# Patient Record
Sex: MALE | Race: WHITE | NOT HISPANIC OR LATINO | Employment: OTHER | URBAN - METROPOLITAN AREA
[De-identification: names, ages, dates, MRNs, and addresses within clinical notes are randomized per-mention and may not be internally consistent; named-entity substitution may affect disease eponyms.]

---

## 2022-09-02 ENCOUNTER — APPOINTMENT (OUTPATIENT)
Dept: MRI IMAGING | Facility: HOSPITAL | Age: 82
End: 2022-09-02

## 2022-09-02 ENCOUNTER — APPOINTMENT (OUTPATIENT)
Dept: GENERAL RADIOLOGY | Facility: HOSPITAL | Age: 82
End: 2022-09-02

## 2022-09-02 ENCOUNTER — APPOINTMENT (OUTPATIENT)
Dept: CT IMAGING | Facility: HOSPITAL | Age: 82
End: 2022-09-02

## 2022-09-02 ENCOUNTER — HOSPITAL ENCOUNTER (OUTPATIENT)
Facility: HOSPITAL | Age: 82
Setting detail: OBSERVATION
Discharge: HOME | End: 2022-09-03
Attending: EMERGENCY MEDICINE | Admitting: PEDIATRICS

## 2022-09-02 DIAGNOSIS — R47.1 DYSARTHRIA: Primary | ICD-10-CM

## 2022-09-02 PROBLEM — I48.20 CHRONIC A-FIB: Status: ACTIVE | Noted: 2022-09-02

## 2022-09-02 PROBLEM — E11.9 DM (DIABETES MELLITUS): Status: ACTIVE | Noted: 2022-09-02

## 2022-09-02 PROBLEM — I10 HTN (HYPERTENSION): Status: ACTIVE | Noted: 2022-09-02

## 2022-09-02 PROBLEM — E78.5 HLD (HYPERLIPIDEMIA): Status: ACTIVE | Noted: 2022-09-02

## 2022-09-02 LAB
ALT SERPL W P-5'-P-CCNC: 14 U/L (ref 1–41)
APTT PPP: 36 SECONDS (ref 22–39)
AST SERPL-CCNC: 17 U/L (ref 1–40)
BASE EXCESS BLDA CALC-SCNC: 6 MMOL/L (ref -5–5)
BASOPHILS # BLD AUTO: 0.04 10*3/MM3 (ref 0–0.2)
BASOPHILS NFR BLD AUTO: 0.7 % (ref 0–1.5)
CA-I BLDA-SCNC: 1.31 MMOL/L (ref 1.2–1.32)
CO2 BLDA-SCNC: 34 MMOL/L (ref 24–29)
CREAT BLDA-MCNC: 1.3 MG/DL (ref 0.6–1.3)
DEPRECATED RDW RBC AUTO: 42.9 FL (ref 37–54)
EOSINOPHIL # BLD AUTO: 0.1 10*3/MM3 (ref 0–0.4)
EOSINOPHIL NFR BLD AUTO: 1.7 % (ref 0.3–6.2)
ERYTHROCYTE [DISTWIDTH] IN BLOOD BY AUTOMATED COUNT: 12.5 % (ref 12.3–15.4)
GLUCOSE BLDC GLUCOMTR-MCNC: 124 MG/DL (ref 70–130)
GLUCOSE BLDC GLUCOMTR-MCNC: 165 MG/DL (ref 70–130)
HCO3 BLDA-SCNC: 31.8 MMOL/L (ref 22–26)
HCT VFR BLD AUTO: 42.3 % (ref 37.5–51)
HCT VFR BLDA CALC: 42 % (ref 38–51)
HGB BLD-MCNC: 14.6 G/DL (ref 13–17.7)
HGB BLDA-MCNC: 14.3 G/DL (ref 12–17)
HOLD SPECIMEN: NORMAL
IMM GRANULOCYTES # BLD AUTO: 0.01 10*3/MM3 (ref 0–0.05)
IMM GRANULOCYTES NFR BLD AUTO: 0.2 % (ref 0–0.5)
INR PPP: 1.2 (ref 0.8–1.2)
LYMPHOCYTES # BLD AUTO: 2.17 10*3/MM3 (ref 0.7–3.1)
LYMPHOCYTES NFR BLD AUTO: 36.7 % (ref 19.6–45.3)
MCH RBC QN AUTO: 32.1 PG (ref 26.6–33)
MCHC RBC AUTO-ENTMCNC: 34.5 G/DL (ref 31.5–35.7)
MCV RBC AUTO: 93 FL (ref 79–97)
MONOCYTES # BLD AUTO: 0.44 10*3/MM3 (ref 0.1–0.9)
MONOCYTES NFR BLD AUTO: 7.4 % (ref 5–12)
NEUTROPHILS NFR BLD AUTO: 3.15 10*3/MM3 (ref 1.7–7)
NEUTROPHILS NFR BLD AUTO: 53.3 % (ref 42.7–76)
NRBC BLD AUTO-RTO: 0 /100 WBC (ref 0–0.2)
PCO2 BLDA: 61.2 MM HG (ref 35–45)
PH BLDA: 7.32 PH UNITS (ref 7.35–7.6)
PLATELET # BLD AUTO: 147 10*3/MM3 (ref 140–450)
PMV BLD AUTO: 9.3 FL (ref 6–12)
PO2 BLDA: 13 MMHG (ref 80–105)
POTASSIUM BLDA-SCNC: 4.4 MMOL/L (ref 3.5–4.9)
PROTHROMBIN TIME: 14.2 SECONDS (ref 12.8–15.2)
QT INTERVAL: 428 MS
QTC INTERVAL: 423 MS
RBC # BLD AUTO: 4.55 10*6/MM3 (ref 4.14–5.8)
SAO2 % BLDA: 13 % (ref 95–98)
SODIUM BLD-SCNC: 141 MMOL/L (ref 138–146)
TROPONIN T SERPL-MCNC: <0.01 NG/ML (ref 0–0.03)
WBC NRBC COR # BLD: 5.91 10*3/MM3 (ref 3.4–10.8)
WHOLE BLOOD HOLD COAG: NORMAL
WHOLE BLOOD HOLD SPECIMEN: NORMAL

## 2022-09-02 PROCEDURE — 93005 ELECTROCARDIOGRAM TRACING: CPT | Performed by: EMERGENCY MEDICINE

## 2022-09-02 PROCEDURE — 84295 ASSAY OF SERUM SODIUM: CPT

## 2022-09-02 PROCEDURE — G0378 HOSPITAL OBSERVATION PER HR: HCPCS

## 2022-09-02 PROCEDURE — 84132 ASSAY OF SERUM POTASSIUM: CPT

## 2022-09-02 PROCEDURE — 85014 HEMATOCRIT: CPT

## 2022-09-02 PROCEDURE — 70496 CT ANGIOGRAPHY HEAD: CPT

## 2022-09-02 PROCEDURE — 84484 ASSAY OF TROPONIN QUANT: CPT | Performed by: EMERGENCY MEDICINE

## 2022-09-02 PROCEDURE — 99285 EMERGENCY DEPT VISIT HI MDM: CPT

## 2022-09-02 PROCEDURE — 82565 ASSAY OF CREATININE: CPT

## 2022-09-02 PROCEDURE — 84450 TRANSFERASE (AST) (SGOT): CPT | Performed by: EMERGENCY MEDICINE

## 2022-09-02 PROCEDURE — 63710000001 INSULIN LISPRO (HUMAN) PER 5 UNITS: Performed by: NURSE PRACTITIONER

## 2022-09-02 PROCEDURE — 70498 CT ANGIOGRAPHY NECK: CPT

## 2022-09-02 PROCEDURE — 99220 PR INITIAL OBSERVATION CARE/DAY 70 MINUTES: CPT | Performed by: INTERNAL MEDICINE

## 2022-09-02 PROCEDURE — 70450 CT HEAD/BRAIN W/O DYE: CPT

## 2022-09-02 PROCEDURE — 0042T HC CT CEREBRAL PERFUSION W/WO CONTRAST: CPT

## 2022-09-02 PROCEDURE — 85610 PROTHROMBIN TIME: CPT

## 2022-09-02 PROCEDURE — 99204 OFFICE O/P NEW MOD 45 MIN: CPT | Performed by: CLINICAL NURSE SPECIALIST

## 2022-09-02 PROCEDURE — 84460 ALANINE AMINO (ALT) (SGPT): CPT | Performed by: EMERGENCY MEDICINE

## 2022-09-02 PROCEDURE — 36415 COLL VENOUS BLD VENIPUNCTURE: CPT

## 2022-09-02 PROCEDURE — 82947 ASSAY GLUCOSE BLOOD QUANT: CPT

## 2022-09-02 PROCEDURE — 82803 BLOOD GASES ANY COMBINATION: CPT

## 2022-09-02 PROCEDURE — 85025 COMPLETE CBC W/AUTO DIFF WBC: CPT | Performed by: EMERGENCY MEDICINE

## 2022-09-02 PROCEDURE — 85730 THROMBOPLASTIN TIME PARTIAL: CPT | Performed by: EMERGENCY MEDICINE

## 2022-09-02 PROCEDURE — 71045 X-RAY EXAM CHEST 1 VIEW: CPT

## 2022-09-02 PROCEDURE — 82330 ASSAY OF CALCIUM: CPT

## 2022-09-02 PROCEDURE — 0 IOPAMIDOL PER 1 ML: Performed by: EMERGENCY MEDICINE

## 2022-09-02 RX ORDER — UBIDECARENONE 100 MG
100 CAPSULE ORAL DAILY
COMMUNITY

## 2022-09-02 RX ORDER — INSULIN LISPRO 100 [IU]/ML
0-7 INJECTION, SOLUTION INTRAVENOUS; SUBCUTANEOUS
Status: DISCONTINUED | OUTPATIENT
Start: 2022-09-02 | End: 2022-09-03 | Stop reason: HOSPADM

## 2022-09-02 RX ORDER — SODIUM CHLORIDE 0.9 % (FLUSH) 0.9 %
10 SYRINGE (ML) INJECTION AS NEEDED
Status: DISCONTINUED | OUTPATIENT
Start: 2022-09-02 | End: 2022-09-03 | Stop reason: HOSPADM

## 2022-09-02 RX ORDER — BISACODYL 5 MG/1
5 TABLET, DELAYED RELEASE ORAL DAILY PRN
Status: DISCONTINUED | OUTPATIENT
Start: 2022-09-02 | End: 2022-09-03 | Stop reason: HOSPADM

## 2022-09-02 RX ORDER — CHOLECALCIFEROL (VITAMIN D3) 125 MCG
5 CAPSULE ORAL
COMMUNITY

## 2022-09-02 RX ORDER — AMOXICILLIN 250 MG
2 CAPSULE ORAL 2 TIMES DAILY PRN
Status: DISCONTINUED | OUTPATIENT
Start: 2022-09-02 | End: 2022-09-03 | Stop reason: HOSPADM

## 2022-09-02 RX ORDER — CHOLECALCIFEROL (VITAMIN D3) 125 MCG
5 CAPSULE ORAL NIGHTLY
Status: DISCONTINUED | OUTPATIENT
Start: 2022-09-02 | End: 2022-09-03 | Stop reason: HOSPADM

## 2022-09-02 RX ORDER — METOPROLOL TARTRATE 100 MG/1
100 TABLET ORAL 2 TIMES DAILY
COMMUNITY

## 2022-09-02 RX ORDER — GYMNEMA LEAF 100 %
600 POWDER (GRAM) MISCELLANEOUS 2 TIMES DAILY
COMMUNITY

## 2022-09-02 RX ORDER — ONDANSETRON 4 MG/1
4 TABLET, FILM COATED ORAL EVERY 6 HOURS PRN
Status: DISCONTINUED | OUTPATIENT
Start: 2022-09-02 | End: 2022-09-03 | Stop reason: HOSPADM

## 2022-09-02 RX ORDER — MECLIZINE HYDROCHLORIDE 25 MG/1
25 TABLET ORAL 3 TIMES DAILY PRN
COMMUNITY

## 2022-09-02 RX ORDER — TAMSULOSIN HYDROCHLORIDE 0.4 MG/1
0.4 CAPSULE ORAL DAILY
Status: DISCONTINUED | OUTPATIENT
Start: 2022-09-03 | End: 2022-09-03 | Stop reason: HOSPADM

## 2022-09-02 RX ORDER — BISACODYL 10 MG
10 SUPPOSITORY, RECTAL RECTAL DAILY PRN
Status: DISCONTINUED | OUTPATIENT
Start: 2022-09-02 | End: 2022-09-03 | Stop reason: HOSPADM

## 2022-09-02 RX ORDER — MECLIZINE HCL 12.5 MG/1
25 TABLET ORAL 3 TIMES DAILY PRN
Status: DISCONTINUED | OUTPATIENT
Start: 2022-09-02 | End: 2022-09-03 | Stop reason: HOSPADM

## 2022-09-02 RX ORDER — ACETAMINOPHEN 325 MG/1
650 TABLET ORAL EVERY 4 HOURS PRN
Status: DISCONTINUED | OUTPATIENT
Start: 2022-09-02 | End: 2022-09-03 | Stop reason: HOSPADM

## 2022-09-02 RX ORDER — SODIUM CHLORIDE 0.9 % (FLUSH) 0.9 %
10 SYRINGE (ML) INJECTION EVERY 12 HOURS SCHEDULED
Status: DISCONTINUED | OUTPATIENT
Start: 2022-09-02 | End: 2022-09-03 | Stop reason: HOSPADM

## 2022-09-02 RX ORDER — ASPIRIN 81 MG/1
81 TABLET, CHEWABLE ORAL DAILY
Status: DISCONTINUED | OUTPATIENT
Start: 2022-09-02 | End: 2022-09-03 | Stop reason: HOSPADM

## 2022-09-02 RX ORDER — POLYETHYLENE GLYCOL 3350 17 G/17G
17 POWDER, FOR SOLUTION ORAL DAILY PRN
Status: DISCONTINUED | OUTPATIENT
Start: 2022-09-02 | End: 2022-09-03 | Stop reason: HOSPADM

## 2022-09-02 RX ORDER — TAMSULOSIN HYDROCHLORIDE 0.4 MG/1
1 CAPSULE ORAL DAILY
COMMUNITY

## 2022-09-02 RX ORDER — ACETAMINOPHEN 160 MG/5ML
650 SOLUTION ORAL EVERY 4 HOURS PRN
Status: DISCONTINUED | OUTPATIENT
Start: 2022-09-02 | End: 2022-09-03 | Stop reason: HOSPADM

## 2022-09-02 RX ORDER — ONDANSETRON 2 MG/ML
4 INJECTION INTRAMUSCULAR; INTRAVENOUS EVERY 6 HOURS PRN
Status: DISCONTINUED | OUTPATIENT
Start: 2022-09-02 | End: 2022-09-03 | Stop reason: HOSPADM

## 2022-09-02 RX ORDER — ASPIRIN 300 MG/1
300 SUPPOSITORY RECTAL DAILY
Status: DISCONTINUED | OUTPATIENT
Start: 2022-09-02 | End: 2022-09-03 | Stop reason: HOSPADM

## 2022-09-02 RX ORDER — NICOTINE POLACRILEX 4 MG
15 LOZENGE BUCCAL
Status: DISCONTINUED | OUTPATIENT
Start: 2022-09-02 | End: 2022-09-03 | Stop reason: HOSPADM

## 2022-09-02 RX ORDER — DEXTROSE MONOHYDRATE 25 G/50ML
25 INJECTION, SOLUTION INTRAVENOUS
Status: DISCONTINUED | OUTPATIENT
Start: 2022-09-02 | End: 2022-09-03 | Stop reason: HOSPADM

## 2022-09-02 RX ORDER — ACETAMINOPHEN 650 MG/1
650 SUPPOSITORY RECTAL EVERY 4 HOURS PRN
Status: DISCONTINUED | OUTPATIENT
Start: 2022-09-02 | End: 2022-09-03 | Stop reason: HOSPADM

## 2022-09-02 RX ORDER — ATORVASTATIN CALCIUM 40 MG/1
80 TABLET, FILM COATED ORAL NIGHTLY
Status: DISCONTINUED | OUTPATIENT
Start: 2022-09-02 | End: 2022-09-03 | Stop reason: HOSPADM

## 2022-09-02 RX ADMIN — Medication 10 ML: at 22:22

## 2022-09-02 RX ADMIN — INSULIN LISPRO 2 UNITS: 100 INJECTION, SOLUTION INTRAVENOUS; SUBCUTANEOUS at 18:27

## 2022-09-02 RX ADMIN — APIXABAN 5 MG: 5 TABLET, FILM COATED ORAL at 22:22

## 2022-09-02 RX ADMIN — IOPAMIDOL 115 ML: 755 INJECTION, SOLUTION INTRAVENOUS at 14:08

## 2022-09-02 RX ADMIN — Medication 5 MG: at 22:22

## 2022-09-02 RX ADMIN — ATORVASTATIN CALCIUM 80 MG: 40 TABLET, FILM COATED ORAL at 22:22

## 2022-09-02 RX ADMIN — ASPIRIN 81 MG 81 MG: 81 TABLET ORAL at 14:47

## 2022-09-02 NOTE — NURSING NOTE
Received Pt from ED. A&O X4. [facility] 2. VSS on RA. Bedside dysphagia completed. Pt has no c/o of pain at this time. Son at bedside.

## 2022-09-02 NOTE — H&P
[facility] Medicine Services  HISTORY AND PHYSICAL    Patient Name: Jefferson Arriaga  : 1940  MRN: [id number]  Primary Care Physician: Brian Ding MD  Date of admission: 2022    Subjective   Subjective     Chief Complaint:  Slurred speech     HPI:  Jefferson Arriaga is a 81 y.o. male with PMH of Afib on anticoagulation, HLD, HTN, BPH, choley, CABG, DM. Patient states he was talking on the phone with his daughter at 9 am this morning and she told him he was slurring his words. He states he felt like he was having trouble focusing and finding what words. He states he has noticed this before with his thoughts. He has been having blurry vision 3-4 times a week for a few months. He denies any cp, soa, N/V/D, decreased sensation, or weakness in any ext. He has been on eliquis for years for afib and he states he takes it as prescribed. On exam he has no sensory deficit, visual changes, no unilateral weakness or problems with speech.     In Ed: creat 1.30, K 4.4, trop <0.010, WBC 5.91,         Review of Systems   Constitutional: Negative for activity change, appetite change, fatigue and fever.   Respiratory: Negative for shortness of breath.    Cardiovascular: Negative for chest pain.   Gastrointestinal: Negative for diarrhea and nausea.   Musculoskeletal: Negative for gait problem.   Neurological: Positive for facial asymmetry and speech difficulty. Negative for weakness and numbness.        All other systems reviewed and are negative.     Personal History     Past Medical History:   Diagnosis Date   • Atrial fibrillation (HCC)    • Diabetes mellitus (HCC)    • Hyperlipidemia    • Hypertension    • Prostate enlargement          Past Surgical History:   Procedure Laterality Date   • ABDOMINAL AORTIC ANEURYSM REPAIR, AORTIC BIFEMORAL ILIAC RENAL BYPASS     • CHOLECYSTECTOMY     • CORONARY ARTERY BYPASS GRAFT     • PROSTATE SURGERY     • TONSILLECTOMY     • VASCULAR SURGERY         Family  History: Mother lived into her 80s, father  in his 70s otherwise pertinent FHx was reviewed and unremarkable.     Social History:  reports that he has quit smoking. He has never used smokeless tobacco. He reports previous alcohol use. He reports that he does not use drugs.  Tired , , lives in [facility], grew up in [place]    Medications:  Alpha-Lipoic Acid, Fenugreek, Gymnema Sylvestris Leaf, apixaban, coenzyme Q10, meclizine, melatonin, metFORMIN, metoprolol tartrate, tamsulosin, and vitamin D3    No Known Allergies    Objective   Objective     Vital Signs:   Temp:  [98.9 °F (37.2 °C)] 98.9 °F (37.2 °C)  Heart Rate:  [54-66] 57  Resp:  [16] 16  BP: (125-160)/() 143/78    Physical Exam   Constitutional: Awake, alert  Eyes: PERRLA, sclerae anicteric, no conjunctival injection  HENT: NCAT, mucous membranes moist  Neck: Supple, no thyromegaly, no lymphadenopathy, trachea midline  Respiratory: Clear to auscultation bilaterally, nonlabored respirations room air 99%   Cardiovascular: RRR, Sinus arrhythmia , no murmurs, rubs, or gallops, palpable pedal pulses bilaterally  Gastrointestinal: Positive bowel sounds, soft, nontender, nondistended  Musculoskeletal: No bilateral ankle edema, no clubbing or cyanosis to extremities  Psychiatric: Appropriate affect, cooperative  Neurologic: Oriented x 3, strength symmetric in all extremities, Cranial Nerves grossly intact to confrontation, speech clear, chronic malu UE tremors, no decreased sensation   Skin: No rashes      Results Reviewed:  I have personally reviewed most recent indicated data and agree with findings including:  [x]  Laboratory  [x]  Radiology  []  EKG/Telemetry  []  Pathology  []  Cardiac/Vascular Studies  []  Old records  []  Other:  Most pertinent findings include:      LAB RESULTS:      Lab 22  1402 22  1357 22  1356   WBC 5.91  --   --    HEMOGLOBIN 14.6  --   --    HEMOGLOBIN, POC  --  14.3  --     HEMATOCRIT 42.3  --   --    HEMATOCRIT POC  --  42  --    PLATELETS 147  --   --    NEUTROS ABS 3.15  --   --    IMMATURE GRANS (ABS) 0.01  --   --    LYMPHS ABS 2.17  --   --    MONOS ABS 0.44  --   --    EOS ABS 0.10  --   --    MCV 93.0  --   --    PROTIME  --   --  14.2   APTT 36.0  --   --          Lab 09/02/22  1408   CREATININE 1.30         Lab 09/02/22  1402   ALT (SGPT) 14   AST (SGOT) 17         Lab 09/02/22  1402 09/02/22  1356   TROPONIN T <0.010  --    PROTIME  --  14.2   INR  --  1.2                 Lab 09/02/22  1357   PH, ARTERIAL 7.32*     Brief Urine Lab Results     None        Microbiology Results (last 10 days)     ** No results found for the last 240 hours. **          CT Angiogram Neck    Result Date: 9/2/2022  DATE OF EXAM: 9/2/2022 or utilized for the vascular catheter with 2:05 PM  PROCEDURE: CT CEREBRAL PERFUSION W WO CONTRAST-, CT HEAD WO CONTRAST STROKE PROTOCOL-, CT ANGIOGRAM NECK-, CT ANGIOGRAM HEAD W AI ANALYSIS OF LVO-  INDICATIONS: Neuro deficit, acute, stroke suspected  COMPARISON: No comparisons available.  TECHNIQUE: Routine transaxial cuts were obtained through the head without administration of contrast. Routine transaxial cuts were then obtained through the head following the intravenous administration of 115 mL of Isovue 300. Core blood volume, core blood flow, mean transit time, and Tmax images were obtained utilizing the Rapid software protocol. A limited CT angiogram of the head was also performed to measure the blood vessel density.  Axial noncontrast CT of the head with multiplanar reconstruction.  Axial IV arterial phase contrast-enhanced CT angiogram of the head and neck. Three-dimensional reconstructions were postprocessed.  AI analysis of LVO was also performed.  The radiation dose reduction device was turned on for each scan per the ALARA (As Low as Reasonably Achievable) protocol.  FINDINGS:  CT head: Gray-white differentiation is maintained and there is no  evidence of intracranial hemorrhage, mass or mass effect. Age-related changes of the brain are present including volume loss and typical periventricular sequela of chronic small vessel ischemia. There is otherwise no evidence of intracranial hemorrhage, mass or mass effect. The ventricles are normal in size and configuration accounting for surrounding volume loss. The orbits are normal and the paranasal sinuses are grossly clear.  CT PERFUSION: Color maps demonstrate no focal relative deficit in cerebral blood volume to suggest core infarct. There is no evidence of territorial ischemic tissue at risk.  CT ANGIOGRAM: The lung apices are grossly clear. Evaluation of the neck soft tissues demonstrates no pathologic adenopathy or unexpected soft tissue neck mass. Evaluation of the osseous structures demonstrates no evidence of acute fracture or aggressive osseous lesion, with some mild multilevel spondylosis change present. On the right, there is no significant atherosclerotic narrowing of the ICA origin, 0% by the set criteria. Similar 0% narrowing is present on the left. The right vertebral artery is dominant, with a diminutive but otherwise patent left vertebral artery. Intracranially, the carotid siphons demonstrate mild calcific atherosclerotic change, without significant resultant luminal narrowing. The anterior cerebral arteries are mildly tortuous, otherwise normal in course and caliber. The right middle cerebral artery demonstrates no evidence of flow-limiting stenosis, large vessel occlusion or aneurysmal dilatation. The left middle cerebral artery is similarly normal in course and caliber. The vertebral arteries demonstrate some calcific atherosclerotic change on the right, without associated high-grade stenosis. The diminutive left vertebral artery terminates in PICA. The basilar artery is patent. The posterior cerebral arteries are normal in course and caliber bilaterally.      Impression: Age-related  changes of the brain as above, otherwise without evidence of acute intracranial abnormality.  CT perfusion demonstrates no evidence of core infarct or significant territorial ischemic tissue at risk.  CT angiogram demonstrates no evidence of flow-limiting stenosis, large vessel occlusion or aneurysmal dilatation. Some multifocal tortuosity is present, suggesting chronic underlying hypertension.  Noncontrast CT head performed at 2:03 PM 9/2/2022. Results discussed with the stroke team by João Brush in person at 2:07 PM same day   This report was finalized on 9/2/2022 2:37 PM by João Brush.      XR Chest 1 View    Result Date: 9/2/2022  XR CHEST 1 VW-  Date of Exam: 9/2/2022 2:03 PM  Indication: Acute Stroke Protocol (onset < 12 hrs).  Comparison Exams: None available.  Technique: Single AP chest radiograph  FINDINGS: The lungs are clear. The heart and mediastinal contours appear normal. The pulmonary vasculature appears normal. The osseous structures appear intact. Median sternotomy wires appear intact.      Impression: No acute cardiopulmonary process identified.  This report was finalized on 9/2/2022 2:53 PM by Jorgito Velasquez MD.      CT Head Without Contrast Stroke Protocol    Result Date: 9/2/2022  DATE OF EXAM: 9/2/2022 or utilized for the vascular catheter with 2:05 PM  PROCEDURE: CT CEREBRAL PERFUSION W WO CONTRAST-, CT HEAD WO CONTRAST STROKE PROTOCOL-, CT ANGIOGRAM NECK-, CT ANGIOGRAM HEAD W AI ANALYSIS OF LVO-  INDICATIONS: Neuro deficit, acute, stroke suspected  COMPARISON: No comparisons available.  TECHNIQUE: Routine transaxial cuts were obtained through the head without administration of contrast. Routine transaxial cuts were then obtained through the head following the intravenous administration of 115 mL of Isovue 300. Core blood volume, core blood flow, mean transit time, and Tmax images were obtained utilizing the Rapid software protocol. A limited CT angiogram of the head was also  performed to measure the blood vessel density.  Axial noncontrast CT of the head with multiplanar reconstruction.  Axial IV arterial phase contrast-enhanced CT angiogram of the head and neck. Three-dimensional reconstructions were postprocessed.  AI analysis of LVO was also performed.  The radiation dose reduction device was turned on for each scan per the ALARA (As Low as Reasonably Achievable) protocol.  FINDINGS:  CT head: Gray-white differentiation is maintained and there is no evidence of intracranial hemorrhage, mass or mass effect. Age-related changes of the brain are present including volume loss and typical periventricular sequela of chronic small vessel ischemia. There is otherwise no evidence of intracranial hemorrhage, mass or mass effect. The ventricles are normal in size and configuration accounting for surrounding volume loss. The orbits are normal and the paranasal sinuses are grossly clear.  CT PERFUSION: Color maps demonstrate no focal relative deficit in cerebral blood volume to suggest core infarct. There is no evidence of territorial ischemic tissue at risk.  CT ANGIOGRAM: The lung apices are grossly clear. Evaluation of the neck soft tissues demonstrates no pathologic adenopathy or unexpected soft tissue neck mass. Evaluation of the osseous structures demonstrates no evidence of acute fracture or aggressive osseous lesion, with some mild multilevel spondylosis change present. On the right, there is no significant atherosclerotic narrowing of the ICA origin, 0% by the set criteria. Similar 0% narrowing is present on the left. The right vertebral artery is dominant, with a diminutive but otherwise patent left vertebral artery. Intracranially, the carotid siphons demonstrate mild calcific atherosclerotic change, without significant resultant luminal narrowing. The anterior cerebral arteries are mildly tortuous, otherwise normal in course and caliber. The right middle cerebral artery demonstrates  no evidence of flow-limiting stenosis, large vessel occlusion or aneurysmal dilatation. The left middle cerebral artery is similarly normal in course and caliber. The vertebral arteries demonstrate some calcific atherosclerotic change on the right, without associated high-grade stenosis. The diminutive left vertebral artery terminates in PICA. The basilar artery is patent. The posterior cerebral arteries are normal in course and caliber bilaterally.      Impression: Age-related changes of the brain as above, otherwise without evidence of acute intracranial abnormality.  CT perfusion demonstrates no evidence of core infarct or significant territorial ischemic tissue at risk.  CT angiogram demonstrates no evidence of flow-limiting stenosis, large vessel occlusion or aneurysmal dilatation. Some multifocal tortuosity is present, suggesting chronic underlying hypertension.  Noncontrast CT head performed at 2:03 PM 9/2/2022. Results discussed with the stroke team by João Brush in person at 2:07 PM same day   This report was finalized on 9/2/2022 2:37 PM by João Brush.      CT Angiogram Head w AI Analysis of LVO    Result Date: 9/2/2022  DATE OF EXAM: 9/2/2022 or utilized for the vascular catheter with 2:05 PM  PROCEDURE: CT CEREBRAL PERFUSION W WO CONTRAST-, CT HEAD WO CONTRAST STROKE PROTOCOL-, CT ANGIOGRAM NECK-, CT ANGIOGRAM HEAD W AI ANALYSIS OF LVO-  INDICATIONS: Neuro deficit, acute, stroke suspected  COMPARISON: No comparisons available.  TECHNIQUE: Routine transaxial cuts were obtained through the head without administration of contrast. Routine transaxial cuts were then obtained through the head following the intravenous administration of 115 mL of Isovue 300. Core blood volume, core blood flow, mean transit time, and Tmax images were obtained utilizing the Rapid software protocol. A limited CT angiogram of the head was also performed to measure the blood vessel density.  Axial noncontrast CT of the  head with multiplanar reconstruction.  Axial IV arterial phase contrast-enhanced CT angiogram of the head and neck. Three-dimensional reconstructions were postprocessed.  AI analysis of LVO was also performed.  The radiation dose reduction device was turned on for each scan per the ALARA (As Low as Reasonably Achievable) protocol.  FINDINGS:  CT head: Gray-white differentiation is maintained and there is no evidence of intracranial hemorrhage, mass or mass effect. Age-related changes of the brain are present including volume loss and typical periventricular sequela of chronic small vessel ischemia. There is otherwise no evidence of intracranial hemorrhage, mass or mass effect. The ventricles are normal in size and configuration accounting for surrounding volume loss. The orbits are normal and the paranasal sinuses are grossly clear.  CT PERFUSION: Color maps demonstrate no focal relative deficit in cerebral blood volume to suggest core infarct. There is no evidence of territorial ischemic tissue at risk.  CT ANGIOGRAM: The lung apices are grossly clear. Evaluation of the neck soft tissues demonstrates no pathologic adenopathy or unexpected soft tissue neck mass. Evaluation of the osseous structures demonstrates no evidence of acute fracture or aggressive osseous lesion, with some mild multilevel spondylosis change present. On the right, there is no significant atherosclerotic narrowing of the ICA origin, 0% by the set criteria. Similar 0% narrowing is present on the left. The right vertebral artery is dominant, with a diminutive but otherwise patent left vertebral artery. Intracranially, the carotid siphons demonstrate mild calcific atherosclerotic change, without significant resultant luminal narrowing. The anterior cerebral arteries are mildly tortuous, otherwise normal in course and caliber. The right middle cerebral artery demonstrates no evidence of flow-limiting stenosis, large vessel occlusion or aneurysmal  dilatation. The left middle cerebral artery is similarly normal in course and caliber. The vertebral arteries demonstrate some calcific atherosclerotic change on the right, without associated high-grade stenosis. The diminutive left vertebral artery terminates in PICA. The basilar artery is patent. The posterior cerebral arteries are normal in course and caliber bilaterally.      Impression: Age-related changes of the brain as above, otherwise without evidence of acute intracranial abnormality.  CT perfusion demonstrates no evidence of core infarct or significant territorial ischemic tissue at risk.  CT angiogram demonstrates no evidence of flow-limiting stenosis, large vessel occlusion or aneurysmal dilatation. Some multifocal tortuosity is present, suggesting chronic underlying hypertension.  Noncontrast CT head performed at 2:03 PM 9/2/2022. Results discussed with the stroke team by João Brush in person at 2:07 PM same day   This report was finalized on 9/2/2022 2:37 PM by João Brush.      CT CEREBRAL PERFUSION WITH & WITHOUT CONTRAST    Result Date: 9/2/2022  DATE OF EXAM: 9/2/2022 or utilized for the vascular catheter with 2:05 PM  PROCEDURE: CT CEREBRAL PERFUSION W WO CONTRAST-, CT HEAD WO CONTRAST STROKE PROTOCOL-, CT ANGIOGRAM NECK-, CT ANGIOGRAM HEAD W AI ANALYSIS OF LVO-  INDICATIONS: Neuro deficit, acute, stroke suspected  COMPARISON: No comparisons available.  TECHNIQUE: Routine transaxial cuts were obtained through the head without administration of contrast. Routine transaxial cuts were then obtained through the head following the intravenous administration of 115 mL of Isovue 300. Core blood volume, core blood flow, mean transit time, and Tmax images were obtained utilizing the Rapid software protocol. A limited CT angiogram of the head was also performed to measure the blood vessel density.  Axial noncontrast CT of the head with multiplanar reconstruction.  Axial IV arterial phase  contrast-enhanced CT angiogram of the head and neck. Three-dimensional reconstructions were postprocessed.  AI analysis of LVO was also performed.  The radiation dose reduction device was turned on for each scan per the ALARA (As Low as Reasonably Achievable) protocol.  FINDINGS:  CT head: Gray-white differentiation is maintained and there is no evidence of intracranial hemorrhage, mass or mass effect. Age-related changes of the brain are present including volume loss and typical periventricular sequela of chronic small vessel ischemia. There is otherwise no evidence of intracranial hemorrhage, mass or mass effect. The ventricles are normal in size and configuration accounting for surrounding volume loss. The orbits are normal and the paranasal sinuses are grossly clear.  CT PERFUSION: Color maps demonstrate no focal relative deficit in cerebral blood volume to suggest core infarct. There is no evidence of territorial ischemic tissue at risk.  CT ANGIOGRAM: The lung apices are grossly clear. Evaluation of the neck soft tissues demonstrates no pathologic adenopathy or unexpected soft tissue neck mass. Evaluation of the osseous structures demonstrates no evidence of acute fracture or aggressive osseous lesion, with some mild multilevel spondylosis change present. On the right, there is no significant atherosclerotic narrowing of the ICA origin, 0% by the set criteria. Similar 0% narrowing is present on the left. The right vertebral artery is dominant, with a diminutive but otherwise patent left vertebral artery. Intracranially, the carotid siphons demonstrate mild calcific atherosclerotic change, without significant resultant luminal narrowing. The anterior cerebral arteries are mildly tortuous, otherwise normal in course and caliber. The right middle cerebral artery demonstrates no evidence of flow-limiting stenosis, large vessel occlusion or aneurysmal dilatation. The left middle cerebral artery is similarly normal  in course and caliber. The vertebral arteries demonstrate some calcific atherosclerotic change on the right, without associated high-grade stenosis. The diminutive left vertebral artery terminates in PICA. The basilar artery is patent. The posterior cerebral arteries are normal in course and caliber bilaterally.      Impression: Age-related changes of the brain as above, otherwise without evidence of acute intracranial abnormality.  CT perfusion demonstrates no evidence of core infarct or significant territorial ischemic tissue at risk.  CT angiogram demonstrates no evidence of flow-limiting stenosis, large vessel occlusion or aneurysmal dilatation. Some multifocal tortuosity is present, suggesting chronic underlying hypertension.  Noncontrast CT head performed at 2:03 PM 9/2/2022. Results discussed with the stroke team by João Brush in person at 2:07 PM same day   This report was finalized on 9/2/2022 2:37 PM by João Brush.            Assessment & Plan   Assessment & Plan       Dysarthria    HTN (hypertension)    HLD (hyperlipidemia)    DM (diabetes mellitus) (HCC)    Chronic a-fib (HCC)  81-year-old with history of hypertension, hyperlipidemia, coronary disease with bypass 20 years ago who presents with dysarthria and right facial droop.    Presumed stroke   Dysarthria-right facial droop  -- stroke team saw in ED   -- start ASA 81 mg daily and statin  -- cont Eliquis   -- consult neurology  -- check A1c and FLP in am   -- CT head, CTA head and neck, no acute abnormality or any significant stenosis or large vessel occlusion  -- MRI of brain pending-patient unable to tolerate due to claustrophobia-not interested in repeating even with a benzodiazepine  -- ECHO in am   -- consult [email or web address]    HTN  HLD  -- allow autoregulation of bp  -- cardene for sbp > 220  -- start statin     Chronic Afib  -- cont eliquis     DM  -- hold home meds  -- check A1c  -- LDSSI    BPH  -- cont flomax     DVT prophylaxis:  [facility]      CODE STATUS:  AND  Medical Intervention Limits: NO intubation (DNI)  Level Of Support Discussed With: Patient  Code Status (Patient has no pulse and is not breathing): No CPR (Do Not Attempt to Resuscitate)  Medical Interventions (Patient has pulse or is breathing): Limited Support      This note has been completed as part of a split-shared workflow.     Electronically signed by RICHARD Lucas, 09/02/22, 5:39 PM EDT.      Attending   Admission Attestation     I have performed an independent face-to-face diagnostic evaluation including performing an independent physical examination as documented here.  The documented plan of care above was reviewed and developed with the advanced practice clinician (APC).    Brief Summary Statement:   Jefferson Arriaga is a 81 y.o. male with a history of  has a past medical history of Atrial fibrillation (HCC), Diabetes mellitus (HCC), Hyperlipidemia, Hypertension, and Prostate enlargement. who presented with 1 day of unknown duration of slurred speech, new right facial droop, he denies any other complaints, headache, changes in vision, chest pain shortness of breath or nausea vomiting diarrhea.  He thinks his speech is returned to normal      Attending Physical Exam:  Temp:  [97.7 °F (36.5 °C)-98.9 °F (37.2 °C)] 98.2 °F (36.8 °C)  Heart Rate:  [54-66] 64  Resp:  [16-18] 18  BP: (125-160)/() 139/71    Constitutional: Awake, alert, interactive and pleasant acute bowel  Eyes: clear sclerae, no conjunctival injection  HENT: NCAT, mucous membranes moist  Neck: no masses or lymphadenopathy, trachea midline  Respiratory: good breath sounds bilaterally, respirations unlabored  Cardiovascular: RRR, no murmurs appreciated, palpable peripheral pulses  Abdomen:  soft, no HSM or masses palpable, not tender or distended  Musculoskeletal: No peripheral edema, clubbing or cyanosis  Neurologic: Oriented x 3,                       Strength symmetric in all extremities                      Cranial Nerves-mild right facial droop, tongue midline, speech clear  Skin: No rashes or jaundice  Psychiatric: Appropriate mood, insight    Result Review:  I have personally reviewed the results from the time of this admission to 9/3/2022 00:21 EDT and agree with these findings:  [x]  Laboratory list / accordion  [x]  Microbiology  [x]  Radiology  [x]  EKG/Telemetry   []  Cardiology/Vascular   []  Pathology  []  Old records  []  Other:  Most notable findings include: No evidence of acute ischemia        Admission Status: I believe that this patient meets  observation status.    Rebecac Mak MD  09/03/22

## 2022-09-02 NOTE — CONSULTS
Stroke Consult Note    Patient Name: Jefferson Arriaga   MRN: [id number]  Age: 81 y.o.  Sex: male  : 1940    Primary Care Physician: No primary care provider on file.  Referring Physician:  No ref. 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 fib for which he takes Eliquis who presents to the emergency department after daughter noted slurred speech when speaking him on the phone today.  He tells me this is happened a couple times over the last few months always improves on its own.  He is unable to really say when his last known well was, his son talked to him on the phone yesterday and his speech was normal.  Then apparently the patient's daughter called him today and she was concerned that his speech was slurred and at that time the patient also felt like his speech seems slurred.  He tells me he had not really spoken to anyone else during the day today so he had not noticed.  His son was also concerned with a slight left facial droop, patient tells me he has not been having trouble swallowing food or drooling.  He has no unilateral weakness, no sensory deficit, no visual changes, no issues with speech fluency or aphasia, he does say over the last few months he has had issues with balance but this is not a new occurrence.    His blood pressure on arrival is 160/106.  Again he takes Eliquis twice daily-reports his compliance with Eliquis, he does not take any antiplatelets daily.    Last Known Normal Date/Time: Yesterday EST     Review of Systems   Constitutional: Negative.    HENT: Negative.    Eyes: Negative.    Cardiovascular: Positive for palpitations.   Genitourinary: Positive for frequency.   Musculoskeletal: Negative.    Skin: Negative.    Neurological: Positive for facial asymmetry and speech  difficulty.   Psychiatric/Behavioral: Negative.       No past medical history on file.  No past surgical history on file.  No family history on file.     No Known Allergies  Prior to Admission medications    Not on File       Temp:  [98.9 °F (37.2 °C)] 98.9 °F (37.2 °C)  Heart Rate:  [60] 60  Resp:  [16] 16  BP: (160)/(106) 160/106  Neurological Exam  Mental Status  Awake, alert and oriented to person, place and time.Alert. Mild dysarthria present. Language is fluent with no aphasia. Fund of knowledge is appropriate for level of education.    Cranial Nerves  CN II: Visual fields full to confrontation.  CN III, IV, VI: Extraocular movements intact bilaterally.  CN V: Facial sensation is normal.  CN VII:  Left: There is central facial weakness.  CN VIII: Hearing appears intact.  CN IX, X: Palate elevates symmetrically  CN XI: Shoulder shrug strength is normal.  CN XII: Tongue midline without atrophy or fasciculations.    Motor   Strength is 5/5 throughout all four extremities.    Sensory  Light touch is normal in upper and lower extremities.     Coordination    Finger-to-nose, rapid alternating movements and heel-to-shin normal bilaterally without dysmetria.    Gait    Not observed.      Physical Exam  Vitals reviewed.   Constitutional:       Appearance: Normal appearance.   HENT:      Head: Normocephalic and atraumatic.      Mouth/Throat:      Mouth: Mucous membranes are dry.   Eyes:      Extraocular Movements: Extraocular movements intact.   Pulmonary:      Effort: Pulmonary effort is normal.   Skin:     General: Skin is warm and dry.   Neurological:      Mental Status: He is alert.      Cranial Nerves: Cranial nerve deficit and dysarthria present.      Coordination: Coordination is intact.      Deep Tendon Reflexes: Strength normal.   Psychiatric:         Mood and Affect: Mood normal.         Acute Stroke Data    IV Thrombolytic (TPA/Tenecteplase) Inclusion / Exclusion Criteria    Time: 14:13 EDT  Person  Administering Scale: RICHARD Delgado    Inclusion Criteria  [x]   18 years of age or greater   []   Onset of symptoms < 4.5 hours before beginning treatment (stroke onset = time patient was last seen well or without symptoms).   []   Diagnosis of acute ischemic stroke causing measurable disabling deficit (Complete Hemianopia, Any Aphasia, Visual or Sensory Extinction, Any weakness limiting sustained effort against gravity)   []   Any remaining deficit considered potentially disabling in view of patient and practitioner   Exclusion criteria (Do not proceed with Alteplase if any are checked under exclusion criteria)  [x]   Onset unknown or GREATER than 4.5 hours   []   ICH on CT/MRI   []   CT demonstrates hypodensity representing acute or subacute infarct   []   Significant head trauma or prior stroke in the previous 3 months   []   Symptoms suggestive of subarachnoid hemorrhage   []   History of un-ruptured intracranial aneurysm GREATER than 10 mm   []   Recent intracranial or intraspinal surgery within the last 3 months   []   Arterial puncture at a non-compressible site in the previous 7 days   []   Active internal bleeding   []   Acute bleeding tendency   []   Platelet count LESS than 100,000 for known hematological diseases such as leukemia, thrombocytopenia or chronic cirrhosis   []   Current use of anticoagulant with INR GREATER than 1.7 or PT GREATER than 15 seconds, aPTT GREATER than 40 seconds   []   Heparin received within 48 hours, resulting in abnormally elevated aPTT GREATER than upper limit of normal   []   Current use of direct thrombin inhibitors or direct factor Xa inhibitors in the past 48 hours   []   Elevated blood pressure refractory to treatment (systolic GREATER than 185 mm/Hg or diastolic  GREATER than 110 mm/Hg   []   Suspected infective endocarditis and aortic arch dissection   []   Current use of therapeutic treatment dose of low-molecular-weight heparin (LMWH) within the previous 24  hours   []   Structural GI malignancy or bleed   Relative exclusion for all patients  []   Only minor non-disabling symptoms   []   Pregnancy   []   Seizure at onset with postictal residual neurological impairments   []   Major surgery or previous trauma within past 14 days   []   History of previous spontaneous ICH, intracranial neoplasm, or AV malformation   []   Postpartum (within previous 14 days)   []   Recent GI or urinary tract hemorrhage (within previous 21 days)   []   Recent acute MI (within previous 3 months)   []   History of un-ruptured intracranial aneurysm LESS than 10 mm   []   History of ruptured intracranial aneurysm   []   Blood glucose LESS than 50 mg/dL (2.7 mmol/L)   []   Dural puncture within the last 7 days   []   Known GREATER than 10 cerebral microbleeds   Additional exclusions for patients with symptoms onset between 3 and 4.5 hours.  []   Age > 80.   []   On any anticoagulants regardless of INR  >>> Warfarin (Coumadin), Heparin, Enoxaparin (Lovenox), fondaparinux (Arixtra), bivalirudin (Angiomax), Argatroban, dabigatran (Pradaxa), rivaroxaban (Xarelto), or apixaban (Eliquis)   []   Severe stroke (NIHSS > 25).   []   History of BOTH diabetes and previous ischemic stroke.   []   The risks and benefits have been discussed with the patient or family related to the administration of IV Alteplase for stroke symptoms.   []   I have discussed and reviewed the patient's case and imaging with the attending prior to IV Thrombolytic (TPA/Tenecteplase).   n/a Time Thrombolytic administered       Hospital Meds:  Scheduled-    Infusions-     PRNs- •  sodium chloride    Functional Status Prior to Current Stroke/Colon Score: 0    NIH Stroke Scale  Time: 14:13 EDT  Person Administering Scale: RICHARD Delgado    Interval: baseline  1a. Level of Consciousness: 0-->Alert, keenly responsive  1b. LOC Questions: 0-->Answers both questions correctly  1c. LOC Commands: 0-->Performs both tasks correctly  2.  Best Gaze: 0-->Normal  3. Visual: 0-->No visual loss  4. Facial Palsy: 1-->Minor paralysis (flattened nasolabial fold, asymmetry on smiling)  5a. Motor Arm, Left: 0-->No drift, limb holds 90 (or 45) degrees for full 10 secs  5b. Motor Arm, Right: 0-->No drift, limb holds 90 (or 45) degrees for full 10 secs  6a. Motor Leg, Left: 0-->No drift, leg holds 30 degree position for full 5 secs  6b. Motor Leg, Right: 0-->No drift, leg holds 30 degree position for full 5 secs  7. Limb Ataxia: 0-->Absent  8. Sensory: 0-->Normal, no sensory loss  9. Best Language: 0-->No aphasia, normal  10. Dysarthria: 1-->Mild-to-moderate dysarthria, patient slurs at least some words and, at worst, can be understood with some difficulty  11. Extinction and Inattention (formerly Neglect): 0-->No abnormality    Total (NIH Stroke Scale): 2    Results Reviewed:  I have personally reviewed current lab, radiology, and data and agree with results.    CT head with no acute abnormality  CT perfusion with no perfusion deficit  CTA head neck did not show any significant stenosis or large vessel occlusion, small amount of plaque noted in the right vertebral artery and right vertebral artery is dominant on my review, radiology report pending    Assessment/Plan:        81-year-old with known stroke risk factors of age, hypertension, diabetes and A. fib for which he takes Eliquis.  He presents with mild dysarthria and slight left facial asymmetry.  His NIH is a 2.  He is not a candidate for IV thrombolytic due to oral anticoagulant use and last known well yesterday.  CTA head neck and perfusion did not show any significant stenosis or large vessel occlusion.        1.  Dysarthria-patient's symptoms could be from small lacunar infarct, he will be admitted for observation.  Stroke order set without thrombolytic therapy will be initiated.  MRI brain without.  Okay to continue his Eliquis 5 mg twice daily.  We will initiate aspirin 81 mg daily.    2.   Hypertension-okay to allow for autoregulation of blood pressure, may use Cardene drip as needed for systolic blood pressure greater than 220.    3.  A. Fib-patient has known FERNANDO sewell, okay to restart his Eliquis 5 mg twice daily this evening.    4.  Hyperlipidemia-patient denies any history of hyperlipidemia, will initiate high intensity statin, check fasting lipid profile in the a.m. and adjust dose as needed.    5.  Activity-can be up with assistance as needed, PT/OT to evaluate and treat.    6.  Diet-should be n.p.o. until passes bedside swallow, then may have heart healthy diet.    Plan was discussed with ED physician, Dr. Calderon, and nurse as well as patient and son at bedside.  All questions answered.  Stroke neurology will continue to follow patient.  Thank you for allowing us to participate in this patient's care.        Jessica Mcconnell DNP, APRN, AGCNS-BC  September 2, 2022  14:13 EDT

## 2022-09-02 NOTE — ED PROVIDER NOTES
EMERGENCY DEPARTMENT ENCOUNTER    Pt Name: Jefferson Arriaga  MRN: [id number]  Pt :   1940  Room Number:    Date of encounter:  2022  PCP: Brian Ding MD  ED Provider: Nadir Calderon MD    Historian: Patient and family      HPI:  Chief Complaint: Slurred speech        Context: Jefferson Arriaga is a 81 y.o. male who presents to the ED c/o slurred speech with uncertain onset.  The patient's son spoke with him yesterday evening and felt his speech was normal.  His daughter spoke with him this morning and felt it was abnormal.  Last known well is impossible to be ascertained however was greater than 4.5 hours ago.  The patient notes mild slurring of speech and mild word finding difficulty.  He denies noemi weakness of his extremities.  He feels as if he is well-hydrated.  He denies prior CVA.      PAST MEDICAL HISTORY  Past Medical History:   Diagnosis Date   • Atrial fibrillation (HCC)    • Diabetes mellitus (HCC)    • Hyperlipidemia    • Hypertension    • Prostate enlargement          PAST SURGICAL HISTORY  Past Surgical History:   Procedure Laterality Date   • ABDOMINAL AORTIC ANEURYSM REPAIR, AORTIC BIFEMORAL ILIAC RENAL BYPASS     • CHOLECYSTECTOMY     • PROSTATE SURGERY     • TONSILLECTOMY     • VASCULAR SURGERY           FAMILY HISTORY  History reviewed. No pertinent family history.      SOCIAL HISTORY  Social History     Socioeconomic History   • Marital status:    Tobacco Use   • Smoking status: Former Smoker   • Smokeless tobacco: Never Used   Vaping Use   • Vaping Use: Never used   Substance and Sexual Activity   • Alcohol use: Not Currently   • Drug use: Never         ALLERGIES  Patient has no known allergies.        REVIEW OF SYSTEMS  Review of Systems       All systems reviewed and negative except for those discussed in HPI.       PHYSICAL EXAM    I have reviewed the triage vital signs and nursing notes.    ED Triage Vitals [22 1339]   Temp Heart Rate Resp BP SpO2   98.9 °F (37.2  °C) 60 16 (!) 160/106 99 %      Temp src Heart Rate Source Patient Position BP Location FiO2 (%)   Oral Monitor Sitting Left arm --       Physical Exam  GENERAL:   Appears pleasant, nontoxic  HENT: Nares patent.  Mild left facial droop no murmurs gallops rubs clear to auscultation  EYES: No scleral icterus.  CV: Regular rhythm, regular rate.  RESPIRATORY: Normal effort.  No audible wheezes, rales or rhonchi.  ABDOMEN: Soft, nontender  MUSCULOSKELETAL: No deformities.   NEURO: Alert, moves all extremities, follows commands.  Slight slurring of speech.  NIH equals 2.  See stroke navigator note for further details.  SKIN: Warm, dry, no rash visualized.        LAB RESULTS  Recent Results (from the past 24 hour(s))   POC Protime / INR    Collection Time: 09/02/22  1:56 PM    Specimen: Blood   Result Value Ref Range    Protime 14.2 12.8 - 15.2 seconds    INR 1.2 0.8 - 1.2   POC Surgery Labs    Collection Time: 09/02/22  1:57 PM    Specimen: Blood   Result Value Ref Range    Ionized Calcium 1.31 1.20 - 1.32 mmol/L    POC Potassium 4.4 3.5 - 4.9 mmol/L    Sodium 141 138 - 146 mmol/L    Total CO2 34 (H) 24 - 29 mmol/L    Hemoglobin 14.3 12.0 - 17.0 g/dL    Hematocrit 42 38 - 51 %    pCO2, Arterial 61.2 (H) 35 - 45 mm Hg    pO2, Arterial 13 (L) 80 - 105 mmHg    Base Excess 6.0000 (H) -5 - 5 mmol/L    O2 Saturation, Arterial 13 (L) 95 - 98 %    pH, Arterial 7.32 (L) 7.35 - 7.6 pH units    HCO3, Arterial 31.8 (H) 22 - 26 mmol/L    Glucose 124 70 - 130 mg/dL   Troponin    Collection Time: 09/02/22  2:02 PM    Specimen: Blood   Result Value Ref Range    Troponin T <0.010 0.000 - 0.030 ng/mL   aPTT    Collection Time: 09/02/22  2:02 PM    Specimen: Blood   Result Value Ref Range    PTT 36.0 22.0 - 39.0 seconds   AST    Collection Time: 09/02/22  2:02 PM    Specimen: Blood   Result Value Ref Range    AST (SGOT) 17 1 - 40 U/L   ALT    Collection Time: 09/02/22  2:02 PM    Specimen: Blood   Result Value Ref Range    ALT (SGPT) 14 1 -  41 U/L   Green Top (Gel)    Collection Time: 09/02/22  2:02 PM   Result Value Ref Range    Extra Tube Hold for add-ons.    Lavender Top    Collection Time: 09/02/22  2:02 PM   Result Value Ref Range    Extra Tube hold for add-on    Gold Top - SST    Collection Time: 09/02/22  2:02 PM   Result Value Ref Range    Extra Tube Hold for add-ons.    Light Blue Top    Collection Time: 09/02/22  2:02 PM   Result Value Ref Range    Extra Tube Hold for add-ons.    CBC Auto Differential    Collection Time: 09/02/22  2:02 PM    Specimen: Blood   Result Value Ref Range    WBC 5.91 3.40 - 10.80 10*3/mm3    RBC 4.55 4.14 - 5.80 10*6/mm3    Hemoglobin 14.6 13.0 - 17.7 g/dL    Hematocrit 42.3 37.5 - 51.0 %    MCV 93.0 79.0 - 97.0 fL    MCH 32.1 26.6 - 33.0 pg    MCHC 34.5 31.5 - 35.7 g/dL    RDW 12.5 12.3 - 15.4 %    RDW-SD 42.9 37.0 - 54.0 fl    MPV 9.3 6.0 - 12.0 fL    Platelets 147 140 - 450 10*3/mm3    Neutrophil % 53.3 42.7 - 76.0 %    Lymphocyte % 36.7 19.6 - 45.3 %    Monocyte % 7.4 5.0 - 12.0 %    Eosinophil % 1.7 0.3 - 6.2 %    Basophil % 0.7 0.0 - 1.5 %    Immature Grans % 0.2 0.0 - 0.5 %    Neutrophils, Absolute 3.15 1.70 - 7.00 10*3/mm3    Lymphocytes, Absolute 2.17 0.70 - 3.10 10*3/mm3    Monocytes, Absolute 0.44 0.10 - 0.90 10*3/mm3    Eosinophils, Absolute 0.10 0.00 - 0.40 10*3/mm3    Basophils, Absolute 0.04 0.00 - 0.20 10*3/mm3    Immature Grans, Absolute 0.01 0.00 - 0.05 10*3/mm3    nRBC 0.0 0.0 - 0.2 /100 WBC   POC Creatinine    Collection Time: 09/02/22  2:08 PM    Specimen: Blood   Result Value Ref Range    Creatinine 1.30 0.60 - 1.30 mg/dL   ECG 12 Lead    Collection Time: 09/02/22  2:30 PM   Result Value Ref Range    QT Interval 428 ms    QTC Interval 423 ms       If labs were ordered, I independently reviewed the results.        RADIOLOGY  XR Chest 1 View    Result Date: 9/2/2022  XR CHEST 1 VW-  Date of Exam: 9/2/2022 2:03 PM  Indication: Acute Stroke Protocol (onset < 12 hrs).  Comparison Exams: None  available.  Technique: Single AP chest radiograph  FINDINGS: The lungs are clear. The heart and mediastinal contours appear normal. The pulmonary vasculature appears normal. The osseous structures appear intact. Median sternotomy wires appear intact.      No acute cardiopulmonary process identified.  This report was finalized on 9/2/2022 2:53 PM by Jorgito Velasquez MD.      CT Head Without Contrast Stroke Protocol, CT Angiogram Head w AI Analysis of LVO, CT CEREBRAL PERFUSION WITH & WITHOUT CONTRAST, CT Angiogram Neck    Result Date: 9/2/2022  DATE OF EXAM: 9/2/2022 or utilized for the vascular catheter with 2:05 PM  PROCEDURE: CT CEREBRAL PERFUSION W WO CONTRAST-, CT HEAD WO CONTRAST STROKE PROTOCOL-, CT ANGIOGRAM NECK-, CT ANGIOGRAM HEAD W AI ANALYSIS OF LVO-  INDICATIONS: Neuro deficit, acute, stroke suspected  COMPARISON: No comparisons available.  TECHNIQUE: Routine transaxial cuts were obtained through the head without administration of contrast. Routine transaxial cuts were then obtained through the head following the intravenous administration of 115 mL of Isovue 300. Core blood volume, core blood flow, mean transit time, and Tmax images were obtained utilizing the Rapid software protocol. A limited CT angiogram of the head was also performed to measure the blood vessel density.  Axial noncontrast CT of the head with multiplanar reconstruction.  Axial IV arterial phase contrast-enhanced CT angiogram of the head and neck. Three-dimensional reconstructions were postprocessed.  AI analysis of LVO was also performed.  The radiation dose reduction device was turned on for each scan per the ALARA (As Low as Reasonably Achievable) protocol.  FINDINGS:  CT head: Gray-white differentiation is maintained and there is no evidence of intracranial hemorrhage, mass or mass effect. Age-related changes of the brain are present including volume loss and typical periventricular sequela of chronic small vessel ischemia. There  is otherwise no evidence of intracranial hemorrhage, mass or mass effect. The ventricles are normal in size and configuration accounting for surrounding volume loss. The orbits are normal and the paranasal sinuses are grossly clear.  CT PERFUSION: Color maps demonstrate no focal relative deficit in cerebral blood volume to suggest core infarct. There is no evidence of territorial ischemic tissue at risk.  CT ANGIOGRAM: The lung apices are grossly clear. Evaluation of the neck soft tissues demonstrates no pathologic adenopathy or unexpected soft tissue neck mass. Evaluation of the osseous structures demonstrates no evidence of acute fracture or aggressive osseous lesion, with some mild multilevel spondylosis change present. On the right, there is no significant atherosclerotic narrowing of the ICA origin, 0% by the set criteria. Similar 0% narrowing is present on the left. The right vertebral artery is dominant, with a diminutive but otherwise patent left vertebral artery. Intracranially, the carotid siphons demonstrate mild calcific atherosclerotic change, without significant resultant luminal narrowing. The anterior cerebral arteries are mildly tortuous, otherwise normal in course and caliber. The right middle cerebral artery demonstrates no evidence of flow-limiting stenosis, large vessel occlusion or aneurysmal dilatation. The left middle cerebral artery is similarly normal in course and caliber. The vertebral arteries demonstrate some calcific atherosclerotic change on the right, without associated high-grade stenosis. The diminutive left vertebral artery terminates in PICA. The basilar artery is patent. The posterior cerebral arteries are normal in course and caliber bilaterally.      Age-related changes of the brain as above, otherwise without evidence of acute intracranial abnormality.  CT perfusion demonstrates no evidence of core infarct or significant territorial ischemic tissue at risk.  CT angiogram  demonstrates no evidence of flow-limiting stenosis, large vessel occlusion or aneurysmal dilatation. Some multifocal tortuosity is present, suggesting chronic underlying hypertension.  Noncontrast CT head performed at 2:03 PM 9/2/2022. Results discussed with the stroke team by João Brush in person at 2:07 PM same day   This report was finalized on 9/2/2022 2:37 PM by João Brush.        I ordered and reviewed the above noted radiographic studies.      I viewed images of CT head which showed no acute bleed or ischemic regions per my independent interpretation.    See radiologist's dictation for official interpretation.        PROCEDURES    Critical Care  Performed by: Nadir Calderon MD  Authorized by: Nadir Calderon MD     Critical care provider statement:     Critical care time (minutes):  30    Critical care time was exclusive of:  Separately billable procedures and treating other patients    Critical care was necessary to treat or prevent imminent or life-threatening deterioration of the following conditions:  CNS failure or compromise    Critical care was time spent personally by me on the following activities:  Ordering and performing treatments and interventions, ordering and review of laboratory studies, ordering and review of radiographic studies, pulse oximetry, re-evaluation of patient's condition, review of old charts, obtaining history from patient or surrogate, examination of patient, evaluation of patient's response to treatment, discussions with consultants and development of treatment plan with patient or surrogate  Comments:      I cared for this patient under code stroke protocol.  He did not meet indications for thrombolytics or Cath Lab intervention.  Both were considered at the outset.        ECG 12 Lead   Final Result   Test Reason : Acute Stroke Protocol (onset < 12 hrs)   Blood Pressure :   */*   mmHG   Vent. Rate :  59 BPM     Atrial Rate :  59 BPM      P-R Int : 170 ms           QRS Dur : 102 ms       QT Int : 428 ms       P-R-T Axes :  87  76  84 degrees      QTc Int : 423 ms      Sinus bradycardia with marked sinus arrhythmia   Cannot rule out Inferior infarct , age undetermined   Abnormal ECG   No previous ECGs available   Confirmed by KAYLA GORMAN MD ([id number]) on 9/2/2022 2:42:20 PM      Referred By: VITA           Confirmed By: KAYLA GORMAN MD          MEDICATIONS GIVEN IN ER    Medications   sodium chloride 0.9 % flush 10 mL (has no administration in time range)   aspirin chewable tablet 81 mg (81 mg Oral Given 9/2/22 1447)     Or   aspirin suppository 300 mg ( Rectal Not Given:  See Alt 9/2/22 1447)   apixaban (ELIQUIS) tablet 5 mg (has no administration in time range)   iopamidol (ISOVUE-370) 76 % injection 150 mL (115 mL Intravenous Given 9/2/22 1408)         PROGRESS, DATA ANALYSIS, CONSULTS, AND MEDICAL DECISION MAKING    All labs have been independently reviewed by me.  All radiology studies have been reviewed by me and the radiologist dictating the report.   EKG's have been independently viewed and interpreted by me.      Differential diagnoses: TIA versus CVA versus dehydration, etc.      ED Course as of 09/02/22 1634   Fri Sep 02, 2022   1628 Patient cared for under code stroke protocol.  I evaluated the patient in the CT scanner.  IV thrombolytics were not administered secondary to last known well greater than 4.5 hours.  Stroke team has evaluated.  We agree that admission is indicated for further stroke work-up.  I contacted the hospitalist for admission. [MS]   1632 I spoke with Dr. Mak who will admit for further stroke evaluation. [MS]      ED Course User Index  [MS] Kayla Gorman MD             AS OF 16:34 EDT VITALS:    BP - 143/78  HR - 57  TEMP - 98.9 °F (37.2 °C) (Oral)  O2 SATS - 96%                  DIAGNOSIS  Final diagnoses:   Dysarthria         DISPOSITION  Admission           Kayla Gorman MD  09/02/22 2032

## 2022-09-03 ENCOUNTER — APPOINTMENT (OUTPATIENT)
Dept: CARDIOLOGY | Facility: HOSPITAL | Age: 82
End: 2022-09-03

## 2022-09-03 ENCOUNTER — APPOINTMENT (OUTPATIENT)
Dept: CT IMAGING | Facility: HOSPITAL | Age: 82
End: 2022-09-03

## 2022-09-03 VITALS
WEIGHT: 192 LBS | RESPIRATION RATE: 16 BRPM | SYSTOLIC BLOOD PRESSURE: 121 MMHG | TEMPERATURE: 98.1 F | BODY MASS INDEX: 23.87 KG/M2 | DIASTOLIC BLOOD PRESSURE: 76 MMHG | HEIGHT: 75 IN | OXYGEN SATURATION: 97 % | HEART RATE: 74 BPM

## 2022-09-03 LAB
ANION GAP SERPL CALCULATED.3IONS-SCNC: 5 MMOL/L (ref 5–15)
ASCENDING AORTA: 3.8 CM
BH CV ECHO MEAS - AI P1/2T: 540.7 MSEC
BH CV ECHO MEAS - AO MAX PG: 8.9 MMHG
BH CV ECHO MEAS - AO MEAN PG: 5 MMHG
BH CV ECHO MEAS - AO ROOT AREA (BSA CORRECTED): 1.7 CM2
BH CV ECHO MEAS - AO ROOT DIAM: 3.7 CM
BH CV ECHO MEAS - AO V2 MAX: 149 CM/SEC
BH CV ECHO MEAS - AO V2 VTI: 35.6 CM
BH CV ECHO MEAS - AVA(I,D): 1.88 CM2
BH CV ECHO MEAS - EDV(CUBED): 157.5 ML
BH CV ECHO MEAS - EDV(MOD-SP2): 124 ML
BH CV ECHO MEAS - EDV(MOD-SP4): 135 ML
BH CV ECHO MEAS - EF(MOD-BP): 59.8 %
BH CV ECHO MEAS - EF(MOD-SP2): 59.8 %
BH CV ECHO MEAS - EF(MOD-SP4): 58.5 %
BH CV ECHO MEAS - ESV(CUBED): 46.7 ML
BH CV ECHO MEAS - ESV(MOD-SP2): 49.9 ML
BH CV ECHO MEAS - ESV(MOD-SP4): 56 ML
BH CV ECHO MEAS - FS: 33.3 %
BH CV ECHO MEAS - IVS/LVPW: 0.89 CM
BH CV ECHO MEAS - IVSD: 0.8 CM
BH CV ECHO MEAS - LA DIMENSION: 4.6 CM
BH CV ECHO MEAS - LAT PEAK E' VEL: 14.3 CM/SEC
BH CV ECHO MEAS - LV MASS(C)D: 167.4 GRAMS
BH CV ECHO MEAS - LV MAX PG: 3.3 MMHG
BH CV ECHO MEAS - LV MEAN PG: 2 MMHG
BH CV ECHO MEAS - LV V1 MAX: 91 CM/SEC
BH CV ECHO MEAS - LV V1 VTI: 19.3 CM
BH CV ECHO MEAS - LVIDD: 5.4 CM
BH CV ECHO MEAS - LVIDS: 3.6 CM
BH CV ECHO MEAS - LVOT AREA: 3.5 CM2
BH CV ECHO MEAS - LVOT DIAM: 2.1 CM
BH CV ECHO MEAS - LVPWD: 0.9 CM
BH CV ECHO MEAS - MED PEAK E' VEL: 8.4 CM/SEC
BH CV ECHO MEAS - MV A MAX VEL: 40.4 CM/SEC
BH CV ECHO MEAS - MV DEC SLOPE: 467.7 CM/SEC2
BH CV ECHO MEAS - MV DEC TIME: 0.25 MSEC
BH CV ECHO MEAS - MV E MAX VEL: 99.5 CM/SEC
BH CV ECHO MEAS - MV E/A: 2.46
BH CV ECHO MEAS - MV MAX PG: 3.9 MMHG
BH CV ECHO MEAS - MV MEAN PG: 2 MMHG
BH CV ECHO MEAS - MV P1/2T: 72 MSEC
BH CV ECHO MEAS - MV V2 VTI: 32.9 CM
BH CV ECHO MEAS - MVA(P1/2T): 3.1 CM2
BH CV ECHO MEAS - MVA(VTI): 2.03 CM2
BH CV ECHO MEAS - PA ACC TIME: 0.11 SEC
BH CV ECHO MEAS - PA PR(ACCEL): 30.6 MMHG
BH CV ECHO MEAS - PA V2 MAX: 99.4 CM/SEC
BH CV ECHO MEAS - PI END-D VEL: 94.5 CM/SEC
BH CV ECHO MEAS - SV(LVOT): 66.8 ML
BH CV ECHO MEAS - SV(MOD-SP2): 74.1 ML
BH CV ECHO MEAS - SV(MOD-SP4): 79 ML
BH CV ECHO MEAS - TAPSE (>1.6): 1.57 CM
BH CV ECHO MEASUREMENTS AVERAGE E/E' RATIO: 8.77
BH CV VAS BP RIGHT ARM: NORMAL MMHG
BH CV XLRA - RV BASE: 3.5 CM
BH CV XLRA - RV LENGTH: 7.8 CM
BH CV XLRA - RV MID: 2.8 CM
BH CV XLRA - TDI S': 17.2 CM/SEC
BUN SERPL-MCNC: 16 MG/DL (ref 8–23)
BUN/CREAT SERPL: 12.9 (ref 7–25)
CALCIUM SPEC-SCNC: 9.2 MG/DL (ref 8.6–10.5)
CHLORIDE SERPL-SCNC: 108 MMOL/L (ref 98–107)
CHOLEST SERPL-MCNC: 92 MG/DL (ref 0–200)
CO2 SERPL-SCNC: 31 MMOL/L (ref 22–29)
CREAT SERPL-MCNC: 1.24 MG/DL (ref 0.76–1.27)
DEPRECATED RDW RBC AUTO: 42.3 FL (ref 37–54)
EGFRCR SERPLBLD CKD-EPI 2021: 59.1 ML/MIN/1.73
ERYTHROCYTE [DISTWIDTH] IN BLOOD BY AUTOMATED COUNT: 12.4 % (ref 12.3–15.4)
FLUAV RNA RESP QL NAA+PROBE: NOT DETECTED
FLUBV RNA RESP QL NAA+PROBE: NOT DETECTED
GLUCOSE BLDC GLUCOMTR-MCNC: 135 MG/DL (ref 70–130)
GLUCOSE BLDC GLUCOMTR-MCNC: 157 MG/DL (ref 70–130)
GLUCOSE SERPL-MCNC: 147 MG/DL (ref 65–99)
HBA1C MFR BLD: 6.7 % (ref 4.8–5.6)
HCT VFR BLD AUTO: 39.4 % (ref 37.5–51)
HDLC SERPL-MCNC: 30 MG/DL (ref 40–60)
HGB BLD-MCNC: 13.5 G/DL (ref 13–17.7)
IVRT: 81 MSEC
LDLC SERPL CALC-MCNC: 45 MG/DL (ref 0–100)
LDLC/HDLC SERPL: 1.49 {RATIO}
LEFT ATRIUM VOLUME INDEX: 46.8 ML/M2
MAXIMAL PREDICTED HEART RATE: 141 BPM
MCH RBC QN AUTO: 31.8 PG (ref 26.6–33)
MCHC RBC AUTO-ENTMCNC: 34.3 G/DL (ref 31.5–35.7)
MCV RBC AUTO: 92.7 FL (ref 79–97)
PLATELET # BLD AUTO: 115 10*3/MM3 (ref 140–450)
PMV BLD AUTO: 9.2 FL (ref 6–12)
POTASSIUM SERPL-SCNC: 4.7 MMOL/L (ref 3.5–5.2)
RBC # BLD AUTO: 4.25 10*6/MM3 (ref 4.14–5.8)
SARS-COV-2 RNA RESP QL NAA+PROBE: NOT DETECTED
SODIUM SERPL-SCNC: 144 MMOL/L (ref 136–145)
STRESS TARGET HR: 120 BPM
TRIGL SERPL-MCNC: 86 MG/DL (ref 0–150)
VLDLC SERPL-MCNC: 17 MG/DL (ref 5–40)
WBC NRBC COR # BLD: 5.13 10*3/MM3 (ref 3.4–10.8)

## 2022-09-03 PROCEDURE — 93306 TTE W/DOPPLER COMPLETE: CPT

## 2022-09-03 PROCEDURE — G0378 HOSPITAL OBSERVATION PER HR: HCPCS

## 2022-09-03 PROCEDURE — 82962 GLUCOSE BLOOD TEST: CPT

## 2022-09-03 PROCEDURE — 87636 SARSCOV2 & INF A&B AMP PRB: CPT | Performed by: INTERNAL MEDICINE

## 2022-09-03 PROCEDURE — 92523 SPEECH SOUND LANG COMPREHEN: CPT

## 2022-09-03 PROCEDURE — 99217 PR OBSERVATION CARE DISCHARGE MANAGEMENT: CPT | Performed by: PEDIATRICS

## 2022-09-03 PROCEDURE — 80048 BASIC METABOLIC PNL TOTAL CA: CPT | Performed by: NURSE PRACTITIONER

## 2022-09-03 PROCEDURE — 63710000001 INSULIN LISPRO (HUMAN) PER 5 UNITS: Performed by: NURSE PRACTITIONER

## 2022-09-03 PROCEDURE — 80061 LIPID PANEL: CPT

## 2022-09-03 PROCEDURE — 97161 PT EVAL LOW COMPLEX 20 MIN: CPT

## 2022-09-03 PROCEDURE — 99213 OFFICE O/P EST LOW 20 MIN: CPT | Performed by: PSYCHIATRY & NEUROLOGY

## 2022-09-03 PROCEDURE — 97165 OT EVAL LOW COMPLEX 30 MIN: CPT

## 2022-09-03 PROCEDURE — 70450 CT HEAD/BRAIN W/O DYE: CPT

## 2022-09-03 PROCEDURE — 93306 TTE W/DOPPLER COMPLETE: CPT | Performed by: INTERNAL MEDICINE

## 2022-09-03 PROCEDURE — 85027 COMPLETE CBC AUTOMATED: CPT | Performed by: NURSE PRACTITIONER

## 2022-09-03 PROCEDURE — 83036 HEMOGLOBIN GLYCOSYLATED A1C: CPT

## 2022-09-03 RX ORDER — ATORVASTATIN CALCIUM 80 MG/1
80 TABLET, FILM COATED ORAL NIGHTLY
Qty: 90 TABLET | Refills: 0 | Status: SHIPPED | OUTPATIENT
Start: 2022-09-03

## 2022-09-03 RX ORDER — ASPIRIN 81 MG/1
81 TABLET, CHEWABLE ORAL DAILY
Qty: 30 TABLET | Refills: 0 | Status: SHIPPED | OUTPATIENT
Start: 2022-09-04

## 2022-09-03 RX ADMIN — APIXABAN 5 MG: 5 TABLET, FILM COATED ORAL at 09:07

## 2022-09-03 RX ADMIN — TAMSULOSIN HYDROCHLORIDE 0.4 MG: 0.4 CAPSULE ORAL at 09:07

## 2022-09-03 RX ADMIN — INSULIN LISPRO 2 UNITS: 100 INJECTION, SOLUTION INTRAVENOUS; SUBCUTANEOUS at 12:37

## 2022-09-03 RX ADMIN — Medication 10 ML: at 09:10

## 2022-09-03 RX ADMIN — ASPIRIN 81 MG 81 MG: 81 TABLET ORAL at 09:06

## 2022-09-03 NOTE — THERAPY DISCHARGE NOTE
Patient Name: Jefferson Arriaga  : 1940    MRN: [id number]                              Today's Date: 9/3/2022       Admit Date: 2022    Visit Dx:     ICD-10-CM ICD-9-CM   1. Dysarthria  R47.1 784.51     Patient Active Problem List   Diagnosis   • Dysarthria   • HTN (hypertension)   • HLD (hyperlipidemia)   • DM (diabetes mellitus) (HCC)   • Chronic a-fib (HCC)     Past Medical History:   Diagnosis Date   • Atrial fibrillation (HCC)    • Diabetes mellitus (HCC)    • Hyperlipidemia    • Hypertension    • Prostate enlargement      Past Surgical History:   Procedure Laterality Date   • ABDOMINAL AORTIC ANEURYSM REPAIR, AORTIC BIFEMORAL ILIAC RENAL BYPASS     • CHOLECYSTECTOMY     • CORONARY ARTERY BYPASS GRAFT     • PROSTATE SURGERY     • TONSILLECTOMY     • VASCULAR SURGERY        General Information     Row Name 22          Physical Therapy Time and Intention    Document Type evaluation;discharge evaluation/summary  -     Mode of Treatment physical therapy  -     Row Name 22          General Information    Patient Profile Reviewed yes  -     Prior Level of Function independent:;all household mobility;community mobility;driving;using stairs;yard work  -     Existing Precautions/Restrictions no known precautions/restrictions  -     Row Name 22          Living Environment    People in Home spouse  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  -     Stair Railings, Within Home, Primary railing on left side (ascending)  -     Row Name 22          Cognition    Orientation Status (Cognition) oriented x 4  -           User Key  (r) = Recorded By, (t) = Taken By, (c) = Cosigned By     Initials Name Provider Type    Libby Main, ROBIN Physical Therapist               Mobility     Row Name 09/03/22 0920          Bed Mobility    Bed Mobility sit-supine;supine-sit  -LS     Supine-Sit [place] (Bed Mobility) independent  -LS     Comment, (Bed Mobility) Per PT's clinical judgement, pt would be indep with all bed mobility, including sit to supine.  -     Row Name 09/03/22 0920          Sit-Stand Transfer    Sit-Stand [place] (Transfers) independent  -LS     Row Name 09/03/22 0920          Gait/Stairs (Locomotion)    [place] Level (Gait) independent  -LS     Distance in Feet (Gait) 160  -LS     Comment, (Gait/Stairs) amb at a good pace with a step-through gait pattern; no losses of balance.  -LS           User Key  (r) = Recorded By, (t) = Taken By, (c) = Cosigned By    Initials Name Provider Type    Libby Main, ROBIN Physical Therapist               Obj/Interventions     Row Name 09/03/22 0920          Range of Motion Comprehensive    General Range of Motion bilateral lower extremity ROM WFL  -LS     Row Name 09/03/22 0920          Strength Comprehensive (MMT)    Comment, General Manual Muscle Testing (MMT) Assessment B hip flexors 4/5. B knee extensors and B ankle dorsiflexors 5/5.  -     Row Name 09/03/22 0920          Motor Skills    Motor Skills coordination  -LS     Coordination heel to shin;WFL  -LS     Row Name 09/03/22 0920          Sensory Assessment (Somatosensory)    Sensory Assessment (Somatosensory) sensation intact;bilateral LE  -LS     Bilateral LE Sensory Assessment light touch awareness;light touch localization;intact  -           User Key  (r) = Recorded By, (t) = Taken By, (c) = Cosigned By    Initials Name Provider Type    Libby Main, PT Physical Therapist               Goals/Plan    No documentation.                Clinical Impression     Row Name 09/03/22 0920          Pain    Pretreatment Pain Rating 0/10 - no pain  -LS      Posttreatment Pain Rating 0/10 - no pain  -     Row Name 09/03/22 0920          Plan of Care Review    Plan of Care Reviewed With patient  -LS     Outcome Evaluation Pt has equal BLE strength, good balance, and is indep with mobility. Skilled PT services are not indicated at this time. This was discussed and agreed upon with patient. Discharge PT.  -     Row Name 09/03/22 0920          Therapy Assessment/Plan (PT)    Patient/Family Therapy Goals Statement (PT) Pt wants to go home.  -     Criteria for Skilled Interventions Met (PT) no;no problems identified which require skilled intervention  -     Therapy Frequency (PT) evaluation only  -     Row Name 09/03/22 0920          Vital Signs    Pre Systolic BP Rehab 131  -LS     Pre Treatment Diastolic BP 81  -LS     Post Systolic BP Rehab 155  -LS     Post Treatment Diastolic BP 75  -LS     Pretreatment Heart Rate (beats/min) 69  -LS     Posttreatment Heart Rate (beats/min) 70  -LS     Pre SpO2 (%) 96  -LS     O2 Delivery Pre Treatment room air  -LS     Post SpO2 (%) 97  -LS     O2 Delivery Post Treatment room air  -LS     Pre Patient Position Supine  -LS     Post Patient Position Sitting  -     Row Name 09/03/22 0920          Positioning and Restraints    Pre-Treatment Position in bed  -LS     Post Treatment Position chair  -LS     In Chair notified nsg;sitting;call light within reach  Pt cleared to be up ad graham with exit alarm deferred. This was discussed and agreed upon with nsg.  -           User Key  (r) = Recorded By, (t) = Taken By, (c) = Cosigned By    Initials Name Provider Type    Libby Main, PT Physical Therapist               Outcome Measures    No documentation.               Physical Therapy Education                 Title: PT OT SLP Therapies (Resolved)     Topic: Physical Therapy (Resolved)     Point: Home exercise program (Resolved)     Learning Progress Summary           Patient Acceptance, E, VU by PATRICIA at 9/3/2022 0920     Comment: Benefits of activity                               User Key     Initials Effective Dates Name Provider Type Discipline    LS 06/16/21 -  Libby Corea, ROBIN Physical Therapist PT              PT Recommendation and Plan     Plan of Care Reviewed With: patient  Outcome Evaluation: Pt has equal BLE strength, good balance, and is indep with mobility. Skilled PT services are not indicated at this time. This was discussed and agreed upon with patient. Discharge PT.     Time Calculation:    PT Charges     Row Name 09/03/22 0920             Time Calculation    Start Time 0920  -LS      PT Received On 09/03/22  -              Untimed Charges    PT Eval/Re-eval Minutes 40  -LS              Total Minutes    Untimed Charges Total Minutes 40  -LS       Total Minutes 40  -LS            User Key  (r) = Recorded By, (t) = Taken By, (c) = Cosigned By    Initials Name Provider Type    LS Libby Corea, ROBIN Physical Therapist              Therapy Charges for Today     Code Description Service Date Service Provider Modifiers Qty    [id number] HC PT EVAL LOW COMPLEXITY 3 9/3/2022 Libby Corea, ROBIN GP 1          PT G-Codes  AM-PAC 6 Clicks Score (PT): 16    PT Discharge Summary  Anticipated Discharge Disposition (PT): home    Libby Corea PT  9/3/2022

## 2022-09-03 NOTE — THERAPY EVALUATION
Acute Care - Speech Language Pathology Initial Evaluation  [facility]   Cognitive-Communication Evaluation       Patient Name: Jefferson Arriaga  : 1940  MRN: [id number]  Today's Date: 9/3/2022               Admit Date: 2022     Visit Dx:    ICD-10-CM ICD-9-CM   1. Dysarthria  R47.1 784.51     Patient Active Problem List   Diagnosis   • Dysarthria   • HTN (hypertension)   • HLD (hyperlipidemia)   • DM (diabetes mellitus) (HCC)   • Chronic a-fib (HCC)     Past Medical History:   Diagnosis Date   • Atrial fibrillation (HCC)    • Diabetes mellitus (HCC)    • Hyperlipidemia    • Hypertension    • Prostate enlargement      Past Surgical History:   Procedure Laterality Date   • ABDOMINAL AORTIC ANEURYSM REPAIR, AORTIC BIFEMORAL ILIAC RENAL BYPASS     • CHOLECYSTECTOMY     • CORONARY ARTERY BYPASS GRAFT     • PROSTATE SURGERY     • TONSILLECTOMY     • VASCULAR SURGERY         SLP Recommendation and Plan  SLP Diagnosis: Speech, language, and cognitive communication abilties are grossly functional per this eval. Dysarthria initially present on adm has resolved. Pt feels as if current level of fx is c/w baseline. No acute needs at this time, SLP will sign off for SLC (22)        SLC Criteria for Skilled Therapy Interventions Met: no problems identified which require skilled intervention (22)  Anticipated Discharge Disposition (SLP): home (22)                                    SLP EVALUATION (last 72 hours)     SLP SLC Evaluation     Row Name 22                   Communication Assessment/Intervention    Document Type evaluation  -MH        Subjective Information no complaints  -MH        Patient Observations alert;cooperative  -MH        Patient/Family/Caregiver Comments/Observations No family present  -MH        Patient Effort good  -MH        Symptoms Noted During/After Treatment none  -MH                  General Information    Patient Profile Reviewed yes  -MH         Pertinent History Of Current Problem Adm w/ slurred speech. Hx: a-fib, DM, HTL, HTN, CABG. Head CT: cerebral atrophy in frontal lobes, otherwise negative for acute intracranial abnormalities  -        Precautions/Limitations, Vision WFL;for purposes of eval  -        Precautions/Limitations, Hearing WFL;for purposes of eval  -        Prior Level of Function-Communication unknown  -        Plans/Goals Discussed with patient;agreed upon  -        Barriers to Rehab none identified  -        Patient's Goals for Discharge return to home  -                  Pain    Additional Documentation Pain Scale: FACES Pre/Post-Treatment (Group)  -                  Pain Scale: FACES Pre/Post-Treatment    Pain: FACES Scale, Pretreatment 0-->no hurt  -        Posttreatment Pain Rating 0-->no hurt  -                  Comprehension Assessment/Intervention    Comprehension Assessment/Intervention Auditory Comprehension;Reading Comprehension  -                  Auditory Comprehension Assessment/Intervention    Auditory Comprehension (Communication) [facility]  -                  Reading Comprehension Assessment/Intervention    Reading Comprehension (Communication) [facility]                  Expression Assessment/Intervention    Expression Assessment/Intervention verbal expression;graphic expression  -                  Verbal Expression Assessment/Intervention    Verbal Expression [facility]  -                  Graphic Expression Assessment/Intervention    Graphic Expression [facility]                  Oral Musculature and Cranial Nerve Assessment    Oral Motor General Assessment [facility]  -                  Motor Speech Assessment/Intervention    Motor Speech Function [facility]  -                  Cognitive Assessment Intervention- SLP    Cognitive Function (Cognition) [facility]                  SLP Evaluation Clinical Impressions    SLP Diagnosis Speech, language, and cognitive communication abilties are grossly functional per this  eval. Dysarthria initially present on adm has resolved. Pt feels as if current level of fx is c/w baseline. No acute needs at this time, SLP will sign off for [facility]  -        Rehab Potential/Prognosis good  -[facility] Criteria for Skilled Therapy Interventions Met no problems identified which require skilled intervention  -        Functional Impact no impact on function  -                  Recommendations    Therapy Frequency (SLP SLC) evaluation only  -        Anticipated Discharge Disposition (SLP) home  -              User Key  (r) = Recorded By, (t) = Taken By, (c) = Cosigned By    Initials Name Effective Dates     Ivis Anderson, MS, FABIO-SLP 06/22/22 -                    EDUCATION  The patient has been educated in the following areas:     Cognitive Impairment Communication Impairment.                      Time Calculation:      Time Calculation- SLP     Row Name 09/03/22 1541             Time Calculation- SLP    SLP Start Time 1425  -      SLP Received On 09/03/22  -              Untimed Charges    12763-JJ Eval Speech and Production w/ Language Minutes 40  -MH              Total Minutes    Untimed Charges Total Minutes 40  -       Total Minutes 40  -            User Key  (r) = Recorded By, (t) = Taken By, (c) = Cosigned By    Initials Name Provider Type     Ivis Anderson, MS, CFY-SLP Speech and Language Pathologist                Therapy Charges for Today     Code Description Service Date Service Provider Modifiers Qty    [id number] HC ST EVAL SPEECH AND PROD W LANG  3 9/3/2022 Ivis Anderson, MS, CFY-SLP GN 1            Patient was not wearing a face mask and did not exhibit coughing during this therapy encounter.  Procedure performed was not aerosolizing, did not involve close contact (within 6 feet for at least 15 minutes or longer), and did not involve contact with infectious secretions or specimens.  Therapist used appropriate personal protective equipment including  gloves, standard procedure mask and eye protection.  Appropriate PPE was worn during the entire therapy session.  Hand hygiene was completed before and after therapy session.            Ivis Anderson MS, CFY-SLP  9/3/2022

## 2022-09-03 NOTE — THERAPY DISCHARGE NOTE
Acute Care - Occupational Therapy Discharge  [facility]    Patient Name: Jefferson Arriaga  : 1940    MRN: [id number]                              Today's Date: 9/3/2022       Admit Date: 2022    Visit Dx:     ICD-10-CM ICD-9-CM   1. Dysarthria  R47.1 784.51     Patient Active Problem List   Diagnosis   • Dysarthria   • HTN (hypertension)   • HLD (hyperlipidemia)   • DM (diabetes mellitus) (HCC)   • Chronic a-fib (HCC)     Past Medical History:   Diagnosis Date   • Atrial fibrillation (HCC)    • Diabetes mellitus (HCC)    • Hyperlipidemia    • Hypertension    • Prostate enlargement      Past Surgical History:   Procedure Laterality Date   • ABDOMINAL AORTIC ANEURYSM REPAIR, AORTIC BIFEMORAL ILIAC RENAL BYPASS     • CHOLECYSTECTOMY     • CORONARY ARTERY BYPASS GRAFT     • PROSTATE SURGERY     • TONSILLECTOMY     • VASCULAR SURGERY        General Information     [facility] Name 22 1052          OT Time and Intention    Document Type evaluation;discharge evaluation/summary  -JY     Mode of Treatment occupational therapy  -     Row Name 22 1052          General Information    Patient Profile Reviewed yes  -JY     Prior Level of Function independent:;all household mobility;community mobility;gait;transfer;bed mobility;ADL's;feeding;grooming;dressing;bathing;home management;cooking;cleaning;driving;shopping;using stairs;yard work  -JY     Existing Precautions/Restrictions no known precautions/restrictions  -JY     Barriers to Rehab none identified  -     Row Name 22 1052          Occupational Profile    Environmental Supports and Barriers (Occupational Profile) tub/shower combo w/o use of shower seat, chair however has access to one as needed, standard toilet height, DME: none used at baseline  -     Row Name 22 1052          Living Environment    People in Home spouse;other (see comments)  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, spouse is unable to be left unattended at home  -[facility] Name 22  1052          Home Main Entrance    Number of Stairs, Main Entrance two;other (see comments)  1 step on to porch and 1 step into home from porch  -[id number]     Row Name 09/03/22 1052          Stairs Within Home, Primary    Stairs, Within Home, Primary flight of stairs to bedroom  -J     Stair Railings, Within Home, Primary railing on left side (ascending)  -     Row Name 09/03/22 1052          Cognition    Orientation Status (Cognition) oriented x 4  -     Row Name 09/03/22 1052          Safety Issues, Functional Mobility    Comment, Safety Issues/Impairments (Mobility) no safety issues or impairments noted/observed that would grossly impact fxl moblity; alert and participatory throughout  -JY           User Key  (r) = Recorded By, (t) = Taken By, (c) = Cosigned By    Initials Name Provider Type    Taniya Jose OT Occupational Therapist               Mobility/ADL's     Row Name 09/03/22 1056          Bed Mobility    Bed Mobility supine-sit;scooting/bridging  -JY     Scooting/Bridging [place] (Bed Mobility) independent  -JY     Supine-Sit [place] (Bed Mobility) independent  -JY     Comment, (Bed Mobility) assessed supine > sitting EOB and scooting hips toward EOB with I, denied any dizziness or feeling LH; based on observation, would anticipate pt I in return to sitting  -     Row Name 09/03/22 1056          Transfers    Transfers sit-stand transfer;stand-sit transfer;toilet transfer  -JY     Comment, (Transfers) demonstrated good safety awareness throughout fxl transfers w/ observed reaching back prior to sitting and pushing through UEs to ascend from seated surface, no noted dizziness or feeling LH in fxl transfers  -JY     Sit-Stand [place] (Transfers) independent  -JY     Stand-Sit [place] (Transfers) independent  -JY     [place] Level (Toilet Transfer) independent  -JY     Assistive Device (Toilet Transfer) other (see comments)  gait belt donned for safety protocol  -     Row  Name 09/03/22 1056          Sit-Stand Transfer    Assistive Device (Sit-Stand Transfers) other (see comments)  gait belt donned for safety protocol  -[facility] Name 09/03/22 1056          Stand-Sit Transfer    Assistive Device (Stand-Sit Transfers) other (see comments)  gait belt donned for safety protocol  -[facility] Name 09/03/22 1056          Toilet Transfer    Type (Toilet Transfer) sit-stand;stand-sit;stand pivot/stand step  based on observation of pt skill set and performance during other fxl t/fs, anticipate I in toilet t/f  -[facility] Name 09/03/22 1056          Functional Mobility    Functional Mobility- Ind. Level independent  -     Functional Mobility- Device other (see comments)  gait belt donned for safety protocol  -     Functional Mobility-Distance (Feet) --  in rooom distances for ADLs  -     Functional Mobility- Comment defer to PT for specifics yet demonstrated I in in room ADL related mobility without AD, able to turn head L < >R without LOB and demonstrated lateral, forward, backward stepping  -[facility] Name 09/03/22 1056          Activities of Daily Living    BADL Assessment/Intervention upper body dressing;lower body dressing;grooming;toileting  -[facility] Name 09/03/22 1056          Upper Body Dressing Assessment/Training    [place] Level (Upper Body Dressing) doff;don;pajama/robe;independent  -JY     Position (Upper Body Dressing) supported sitting;unsupported sitting  -[facility] Name 09/03/22 1056          Lower Body Dressing Assessment/Training    [place] Level (Lower Body Dressing) doff;don;shoes/slippers;socks;independent  -JY     Position (Lower Body Dressing) supported sitting;unsupported sitting  -[facility] Name 09/03/22 1056          Grooming Assessment/Training    [place] Level (Grooming) wash face, hands;independent  -JY     Position (Grooming) supported sitting  -JY     Row Name 09/03/22 1056          Toileting Assessment/Training    [place] Level  (Toileting) adjust/manage clothing;independent  -JY     Assistive Devices (Toileting) commode  -JY     Comment, (Toileting) anticipate I in toileting tasks given observation of pt completing STS, SPT t/fs and skill set including adequate ROM, strength, coordination and general fxl endurance; pt deferred toileting during OT session  -JY           User Key  (r) = Recorded By, (t) = Taken By, (c) = Cosigned By    Initials Name Provider Type    Taniya Jose OT Occupational Therapist               Obj/Interventions     Row Name 09/03/22 1103          Sensory Assessment (Somatosensory)    Sensory Assessment (Somatosensory) bilateral UE;sensation intact  -JY     Bilateral UE Sensory Assessment general sensation;light touch awareness;light touch localization;intact  -JY     Sensory Assessment denies any numbness or tingling and able to recognize LT stimuli as intact and symmetrical at Es  -J     Row Name 09/03/22 1103          Vision Assessment/Intervention    Visual Impairment/Limitations corrective lenses for reading;other (see comments)  wears eyewear as needed, did not don during OT eval  -JY     Vision Assessment Comment denies any acute changes to vision including no blurred or incomplete vision; able to track L < > R, up/down and able to correctly identify L & R stimuli peripherally  -J     Row Name 09/03/22 1103          Range of Motion Comprehensive    General Range of Motion bilateral upper extremity ROM WNL  -     Row Name 09/03/22 1103          Strength Comprehensive (MMT)    General Manual Muscle Testing (MMT) Assessment no strength deficits identified  -JY     Comment, General Manual Muscle Testing (MMT) Assessment gross MMS 5/5 at RUST, no asymmetry or motor drift noted  -     Row Name 09/03/22 1103          Motor Skills    Motor Skills coordination  -JY     Coordination finger to nose;other (see comments);heel to shin;WFL  finger to thumb opposition  -     Row Name 09/03/22 1103           Balance    Balance Assessment sitting static balance;sitting dynamic balance;standing static balance;standing dynamic balance  -JY     Static Sitting Balance independent  -JY     Dynamic Sitting Balance independent  -JY     Position, Sitting Balance supported;unsupported;sitting edge of bed;sitting in chair  -JY     Static Standing Balance independent  -JY     Dynamic Standing Balance independent  -JY     Position/Device Used, Standing Balance unsupported;other (see comments)  gait belt donned for safety protocol  -JY     Balance Interventions sitting;standing;static;dynamic;sit to stand;supported;occupation based/functional task  -JY     Comment, Balance no LOB during any seated or standing tasks with no need for AD throughout  -JY           User Key  (r) = Recorded By, (t) = Taken By, (c) = Cosigned By    Initials Name Provider Type    Taniya Jose OT Occupational Therapist               Goals/Plan     Row Name 09/03/22 1117          Problem Specific Goal 1 (OT)    Problem Specific Goal 1 (OT) Pt to verbalize, acknowledge s/s CVA and need for timely care as well as general home safety in order to maximize safety and I in home.  -JY     Time Frame (Problem Specific Goal 1, OT) short term goal (STG);by discharge  -JY     Progress/Outcome (Problem Specific Goal 1, OT) goal met  -JY     Row Name 09/03/22 1117          Therapy Assessment/Plan (OT)    Planned Therapy Interventions (OT) patient/caregiver education/training  -JY           User Key  (r) = Recorded By, (t) = Taken By, (c) = Cosigned By    Initials Name Provider Type    Taniya Jose OT Occupational Therapist               Clinical Impression     Row Name 09/03/22 1108          Pain Assessment    Pretreatment Pain Rating 0/10 - no pain  -JY     Posttreatment Pain Rating 0/10 - no pain  -JY     Pre/Posttreatment Pain Comment denies any pain and tolerated all OT interventions  -JY     Pain Intervention(s) Repositioned;Ambulation/increased activity   -JAMES Hyde Name 09/03/22 1108          Plan of Care Review    Plan of Care Reviewed With patient  -JY     Progress improving  -JY     Outcome Evaluation OT evaluation completed. Pt presents with baseline performance in ADLs, related t/fs, mobility with gross I in each. Pt presents with functional strength, ROM at BUEs, no sensation or coordination deficits, no balance deficits in sitting or standing without AD. Educated pt on recognizing s/s of CVA and importance of seeking timely care and further general home safety to maximize I and safety in preparation for home and resumed responsibilities. No further OT services warranted at this time. Recommend home with A when medically ready.  -JAMES Hyde Name 09/03/22 1108          Therapy Assessment/Plan (OT)    Patient/Family Therapy Goal Statement (OT) to maximize I in ADLs, related t/fs, return to PLOF  -JY     Rehab Potential (OT) good, to achieve stated therapy goals  -JY     Criteria for Skilled Therapeutic Interventions Met (OT) yes;skilled treatment is necessary  -JY     Therapy Frequency (OT) evaluation only  -JAMES Hyde Name 09/03/22 1108          Therapy Plan Review/Discharge Plan (OT)    Anticipated Discharge Disposition (OT) home with assist  -JAMES Hyde Name 09/03/22 1108          Vital Signs    Pre Systolic BP Rehab 131  -JY     Pre Treatment Diastolic BP 81  -JY     Post Systolic BP Rehab 155  -JY     Post Treatment Diastolic BP 75  -JY     Pretreatment Heart Rate (beats/min) 70  -JY     Posttreatment Heart Rate (beats/min) 69  -JY     Pre SpO2 (%) 96  -JY     O2 Delivery Pre Treatment room air  -JY     O2 Delivery Intra Treatment room air  -JY     Post SpO2 (%) 97  -JY     O2 Delivery Post Treatment room air  -JY     Pre Patient Position Supine  -JY     Intra Patient Position Standing  -JY     Post Patient Position Sitting  -JY     [place]rett Name 09/03/22 1108          Positioning and Restraints    Pre-Treatment Position in bed  -JY     Post Treatment Position  chair  -JAMES     In Chair notified nsg;reclined;call light within reach;encouraged to call for assist;waffle cushion;legs elevated  pt cleared by PT to be up ad graham with exit alarm not engaged, RN aware and agreed upon plan  -JAMES           User Key  (r) = Recorded By, (t) = Taken By, (c) = Cosigned By    Initials Name Provider Type    Taniya Jose OT Occupational Therapist               Outcome Measures     Row Name 09/03/22 1119          How much help from another is currently needed...    Putting on and taking off regular lower body clothing? 4  -JY     Bathing (including washing, rinsing, and drying) 4  -JY     Toileting (which includes using toilet bed pan or urinal) 4  -JY     Putting on and taking off regular upper body clothing 4  -JY     Taking care of personal grooming (such as brushing teeth) 4  -JY     Eating meals 4  -JY     AM-PAC 6 Clicks Score (OT) 24  -MATEUSZ     Row Name 09/03/22 1119          Modified Lane Scale    Pre-Stroke Modified Lane Scale 6 - Unable to determine (UTD) from the medical record documentation  -JY     Modified Lane Scale 0 - No Symptoms at all.  -JAMES     Row Name 09/03/22 1119          Functional Assessment    Outcome Measure Options AM-PAC 6 Clicks Daily Activity (OT);Modified Timothy  -MATEUSZ           User Key  (r) = Recorded By, (t) = Taken By, (c) = Cosigned By    Initials Name Provider Type    Taniya Jose OT Occupational Therapist              Occupational Therapy Education                 Title: PT OT SLP Therapies (In Progress)     Topic: Occupational Therapy (In Progress)     Point: ADL training (Done)     Description:   Instruct learner(s) on proper safety adaptation and remediation techniques during self care or transfers.   Instruct in proper use of assistive devices.              Learning Progress Summary           Patient Acceptance, MARCELINO GUALLPA DU by JAMES at 9/3/2022 0930                   Point: Home exercise program (Not Started)     Description:   Instruct  learner(s) on appropriate technique for monitoring, assisting and/or progressing therapeutic exercises/activities.              Learner Progress:  Not documented in this visit.          Point: Precautions (Done)     Description:   Instruct learner(s) on prescribed precautions during self-care and functional transfers.              Learning Progress Summary           Patient Acceptance, MARCELINO GUALLPA DU by JAMES at 9/3/2022 0930                   Point: Body mechanics (Done)     Description:   Instruct learner(s) on proper positioning and spine alignment during self-care, functional mobility activities and/or exercises.              Learning Progress Summary           Patient Acceptance, MARCELINO GUALLPA DU by JAMES at 9/3/2022 0930                               User Key     Initials Effective Dates Name Provider Type Discipline    JAMES 06/16/21 -  Taniya Barrow OT Occupational Therapist OT              OT Recommendation and Plan  Planned Therapy Interventions (OT): patient/caregiver education/training  Therapy Frequency (OT): evaluation only  Plan of Care Review  Plan of Care Reviewed With: patient  Progress: improving  Outcome Evaluation: OT evaluation completed. Pt presents with baseline performance in ADLs, related t/fs, mobility with gross I in each. Pt presents with functional strength, ROM at BUEs, no sensation or coordination deficits, no balance deficits in sitting or standing without AD. Educated pt on recognizing s/s of CVA and importance of seeking timely care and further general home safety to maximize I and safety in preparation for home and resumed responsibilities. No further OT services warranted at this time. Recommend home with A when medically ready.  Plan of Care Reviewed With: patient  Outcome Evaluation: OT evaluation completed. Pt presents with baseline performance in ADLs, related t/fs, mobility with gross I in each. Pt presents with functional strength, ROM at BUEs, no sensation or coordination deficits, no balance  deficits in sitting or standing without AD. Educated pt on recognizing s/s of CVA and importance of seeking timely care and further general home safety to maximize I and safety in preparation for home and resumed responsibilities. No further OT services warranted at this time. Recommend home with A when medically ready.     Time Calculation:    Time Calculation- OT     Row Name 09/03/22 1121             Time Calculation- OT    OT Start Time 0930  -JY      OT Received On 09/03/22  -JY              Untimed Charges    OT Eval/Re-eval Minutes 46  -JY              Total Minutes    Untimed Charges Total Minutes 46  -JY       Total Minutes 46  -JY            User Key  (r) = Recorded By, (t) = Taken By, (c) = Cosigned By    Initials Name Provider Type    Taniya Jose OT Occupational Therapist              Therapy Charges for Today     Code Description Service Date Service Provider Modifiers Qty    [id number]  OT EVAL LOW COMPLEXITY 4 9/3/2022 Taniya Barrow OT GO 1             OT Discharge Summary  Anticipated Discharge Disposition (OT): home with assist  Reason for Discharge: All goals achieved, Independent, At baseline function  Outcomes Achieved: Able to achieve all goals within established timeline, Refer to plan of care for updates on goals achieved  Discharge Destination: Home with assist    Taniya Barrow OT  9/3/2022

## 2022-09-03 NOTE — DISCHARGE INSTRUCTIONS
Take all medications as instructed.  Touch base with PCP for a hospital follow up appointment.  Would recommend discussing any herbal supplements with your PCP for any medication side effects or health complications associated with these medications.

## 2022-09-03 NOTE — PROGRESS NOTES
Neurology Note    Patient:  Jefferson Arriaga    YOB: 1940    REFERRING PHYSICIAN:  Dr. Bush    CHIEF COMPLAINT:    Slurred speech    HISTORY OF PRESENT ILLNESS:   The patient denies any new stroke symptoms. He could not complete MRI 22 severe claustrophobia, refuses to try again.    Past Medical History:  Past Medical History:   Diagnosis Date   • Atrial fibrillation (HCC)    • Diabetes mellitus (HCC)    • Hyperlipidemia    • Hypertension    • Prostate enlargement        Past Surgical History:  Past Surgical History:   Procedure Laterality Date   • ABDOMINAL AORTIC ANEURYSM REPAIR, AORTIC BIFEMORAL ILIAC RENAL BYPASS     • CHOLECYSTECTOMY     • CORONARY ARTERY BYPASS GRAFT     • PROSTATE SURGERY     • TONSILLECTOMY     • VASCULAR SURGERY         Social History:   Social History     Socioeconomic History   • Marital status:    Tobacco Use   • Smoking status: Former Smoker   • Smokeless tobacco: Never Used   Vaping Use   • Vaping Use: Never used   Substance and Sexual Activity   • Alcohol use: Not Currently   • Drug use: Never        Family History:   History reviewed. No pertinent family history.    Medications Prior to Admission:    Prior to Admission medications    Medication Sig Start Date End Date Taking? Authorizing Provider   ALPHA LIPOIC ACID PO Take 600 mg by mouth Daily.    Willa Vidal MD   apixaban (ELIQUIS) 5 MG tablet tablet Take 5 mg by mouth 2 (Two) Times a Day.    Willa Vidal MD   coenzyme Q10 100 MG capsule Take 100 mg by mouth Daily.    Willa Vidal MD   FENUGREEK PO Take 1,220 mg by mouth 2 (Two) Times a Day.    Willa Vidal MD   Gymnema Sylvestris Leaf powder 600 mg 2 (Two) Times a Day.    Willa Vidal MD   meclizine (ANTIVERT) 25 MG tablet Take 25 mg by mouth 3 (Three) Times a Day As Needed for Dizziness.    Willa Vidal MD   melatonin 5 MG tablet tablet Take 5 mg by mouth.    Willa Vidal MD   metFORMIN  (GLUCOPHAGE) 500 MG tablet Take 500 mg by mouth 2 (Two) Times a Day With Meals.    Willa Vidal MD   metoprolol tartrate (LOPRESSOR) 100 MG tablet Take 100 mg by mouth 2 (Two) Times a Day.    Willa Vidal MD   tamsulosin (FLOMAX) 0.4 MG capsule 24 hr capsule Take 1 capsule by mouth Daily.    Willa Vidal MD   vitamin D3 125 MCG (5000 UT) capsule capsule Take 5,000 Units by mouth Daily.    Willa Vidal MD       Allergies:  Patient has no known allergies.      Review of system  Review of Systems   Neurological: Negative for facial asymmetry, speech difficulty, weakness and numbness.   All other systems reviewed and are negative.      Vitals:    09/03/22 1155   BP: 121/76   Pulse: 74   Resp: 16   Temp: 98.1 °F (36.7 °C)   SpO2: 97%       Physical exam  Physical Exam  Cardiovascular:      Rate and Rhythm: Normal rate.   Pulmonary:      Effort: Pulmonary effort is normal.   Neurological:      Comments: Speech clear, mild left side of the mouth droop, VFF, no limb drift, walks w/o help.           Lab Results   Component Value Date    WBC 5.13 09/03/2022    HGB 13.5 09/03/2022    HCT 39.4 09/03/2022    MCV 92.7 09/03/2022     (L) 09/03/2022     Lab Results   Component Value Date    GLUCOSE 147 (H) 09/03/2022    BUN 16 09/03/2022    CREATININE 1.24 09/03/2022    BCR 12.9 09/03/2022    CO2 31.0 (H) 09/03/2022    CALCIUM 9.2 09/03/2022    AST 17 09/02/2022    ALT 14 09/02/2022   Contains abnormal data Lipid Panel  Order: [id number]   Status: Final result     Visible to patient: No (scheduled for 9/3/2022  9:16 PM)     Next appt: None    Specimen Information: Blood         0 Result Notes    Component   Ref Range & Units 06:27    Total Cholesterol   0 - 200 mg/dL 92    Triglycerides   0 - 150 mg/dL 86    HDL Cholesterol   40 - 60 mg/dL 30 Low     LDL Cholesterol    0 - 100 mg/dL 45    VLDL Cholesterol   5 - 40 mg/dL 17    LDL/HDL Ratio  1.49         Hemoglobin A1C   4.80 - 5.60 %  6.70 High        ECG 12 Lead (Order [id number])    Order-Level Documents:    Scan on 9/2/2022 1430 by [facility] [employer], [place]: ECG 12-LEAD         Author: -- Service: -- Author Type: --   Filed:  Date of Service:  Creation Time:    Status: (Other)      Test Reason : Acute Stroke Protocol (onset < 12 hrs)  Blood Pressure :   */*   mmHG  Vent. Rate :  59 BPM     Atrial Rate :  59 BPM     P-R Int : 170 ms          QRS Dur : 102 ms      QT Int : 428 ms       P-R-T Axes :  87  76  84 degrees     QTc Int : 423 ms     Sinus bradycardia with marked sinus arrhythmia  Cannot rule out Inferior infarct , age undetermined  Abnormal ECG  No previous ECGs available  Confirmed by KAYLA GORMAN MD ([id number]) on 9/2/2022 2:42:20 PM     Referred By: VITA           Confirmed By: KAYLA GORMAN MD            Signed    Electronically signed by Kayla Gorman MD on 9/2/22 at 1442 EDT         Radiological Studies:  Adult Transthoracic Echo Complete W/ Cont if Necessary Per Protocol (With Agitated Saline)    Result Date: 9/3/2022  · Left ventricular ejection fraction appears to be 56 - 60%. Left ventricular systolic function is normal. · Left ventricular diastolic function was normal. · Left atrial volume is moderately increased. · Saline test results are negative for right to left atrial level shunt. · Estimated right ventricular systolic pressure from tricuspid regurgitation is normal (<35 mmHg).      CT Head Without Contrast    Result Date: 9/3/2022   DATE OF EXAM: 9/3/2022 8:23 AM  PROCEDURE: CT HEAD WO CONTRAST-  INDICATIONS: Stroke, follow up; R47.1-Dysarthria and anarthria  COMPARISON: September 2, 2013  TECHNIQUE: Routine transaxial cuts were obtained through the head without the administration of contrast. Automated exposure control and iterative reconstruction methods were used.  FINDINGS: There is cerebral atrophy more marked in the frontal lobes. There are some white matter changes suggested involving the cerebral hemispheres which  could reflect more chronic small vessel ischemic change. There is no underlying hemorrhage. There is no midline shift. The paranasal sinuses seem relatively clear.      1.  Cerebral atrophy more marked in the frontal lobes. 2.  White matter changes which could reflect more chronic small vessel ischemic change. This appears more pronounced in the right frontal lobe.  This report was finalized on 9/3/2022 9:26 AM by Wayne Morrell MD.      CT Angiogram Neck    Result Date: 9/2/2022  DATE OF EXAM: 9/2/2022 or utilized for the vascular catheter with 2:05 PM  PROCEDURE: CT CEREBRAL PERFUSION W WO CONTRAST-, CT HEAD WO CONTRAST STROKE PROTOCOL-, CT ANGIOGRAM NECK-, CT ANGIOGRAM HEAD W AI ANALYSIS OF LVO-  INDICATIONS: Neuro deficit, acute, stroke suspected  COMPARISON: No comparisons available.  TECHNIQUE: Routine transaxial cuts were obtained through the head without administration of contrast. Routine transaxial cuts were then obtained through the head following the intravenous administration of 115 mL of Isovue 300. Core blood volume, core blood flow, mean transit time, and Tmax images were obtained utilizing the Rapid software protocol. A limited CT angiogram of the head was also performed to measure the blood vessel density.  Axial noncontrast CT of the head with multiplanar reconstruction.  Axial IV arterial phase contrast-enhanced CT angiogram of the head and neck. Three-dimensional reconstructions were postprocessed.  AI analysis of LVO was also performed.  The radiation dose reduction device was turned on for each scan per the ALARA (As Low as Reasonably Achievable) protocol.  FINDINGS:  CT head: Gray-white differentiation is maintained and there is no evidence of intracranial hemorrhage, mass or mass effect. Age-related changes of the brain are present including volume loss and typical periventricular sequela of chronic small vessel ischemia. There is otherwise no evidence of intracranial hemorrhage, mass or mass  effect. The ventricles are normal in size and configuration accounting for surrounding volume loss. The orbits are normal and the paranasal sinuses are grossly clear.  CT PERFUSION: Color maps demonstrate no focal relative deficit in cerebral blood volume to suggest core infarct. There is no evidence of territorial ischemic tissue at risk.  CT ANGIOGRAM: The lung apices are grossly clear. Evaluation of the neck soft tissues demonstrates no pathologic adenopathy or unexpected soft tissue neck mass. Evaluation of the osseous structures demonstrates no evidence of acute fracture or aggressive osseous lesion, with some mild multilevel spondylosis change present. On the right, there is no significant atherosclerotic narrowing of the ICA origin, 0% by the set criteria. Similar 0% narrowing is present on the left. The right vertebral artery is dominant, with a diminutive but otherwise patent left vertebral artery. Intracranially, the carotid siphons demonstrate mild calcific atherosclerotic change, without significant resultant luminal narrowing. The anterior cerebral arteries are mildly tortuous, otherwise normal in course and caliber. The right middle cerebral artery demonstrates no evidence of flow-limiting stenosis, large vessel occlusion or aneurysmal dilatation. The left middle cerebral artery is similarly normal in course and caliber. The vertebral arteries demonstrate some calcific atherosclerotic change on the right, without associated high-grade stenosis. The diminutive left vertebral artery terminates in PICA. The basilar artery is patent. The posterior cerebral arteries are normal in course and caliber bilaterally.      Age-related changes of the brain as above, otherwise without evidence of acute intracranial abnormality.  CT perfusion demonstrates no evidence of core infarct or significant territorial ischemic tissue at risk.  CT angiogram demonstrates no evidence of flow-limiting stenosis, large vessel  occlusion or aneurysmal dilatation. Some multifocal tortuosity is present, suggesting chronic underlying hypertension.  Noncontrast CT head performed at 2:03 PM 9/2/2022. Results discussed with the stroke team by João Brush in person at 2:07 PM same day   This report was finalized on 9/2/2022 2:37 PM by João Brush.      XR Chest 1 View    Result Date: 9/2/2022  XR CHEST 1 VW-  Date of Exam: 9/2/2022 2:03 PM  Indication: Acute Stroke Protocol (onset < 12 hrs).  Comparison Exams: None available.  Technique: Single AP chest radiograph  FINDINGS: The lungs are clear. The heart and mediastinal contours appear normal. The pulmonary vasculature appears normal. The osseous structures appear intact. Median sternotomy wires appear intact.      No acute cardiopulmonary process identified.  This report was finalized on 9/2/2022 2:53 PM by Jorgito Velasquez MD.      CT Head Without Contrast Stroke Protocol    Result Date: 9/2/2022  DATE OF EXAM: 9/2/2022 or utilized for the vascular catheter with 2:05 PM  PROCEDURE: CT CEREBRAL PERFUSION W WO CONTRAST-, CT HEAD WO CONTRAST STROKE PROTOCOL-, CT ANGIOGRAM NECK-, CT ANGIOGRAM HEAD W AI ANALYSIS OF LVO-  INDICATIONS: Neuro deficit, acute, stroke suspected  COMPARISON: No comparisons available.  TECHNIQUE: Routine transaxial cuts were obtained through the head without administration of contrast. Routine transaxial cuts were then obtained through the head following the intravenous administration of 115 mL of Isovue 300. Core blood volume, core blood flow, mean transit time, and Tmax images were obtained utilizing the Rapid software protocol. A limited CT angiogram of the head was also performed to measure the blood vessel density.  Axial noncontrast CT of the head with multiplanar reconstruction.  Axial IV arterial phase contrast-enhanced CT angiogram of the head and neck. Three-dimensional reconstructions were postprocessed.  AI analysis of LVO was also performed.  The  radiation dose reduction device was turned on for each scan per the ALARA (As Low as Reasonably Achievable) protocol.  FINDINGS:  CT head: Gray-white differentiation is maintained and there is no evidence of intracranial hemorrhage, mass or mass effect. Age-related changes of the brain are present including volume loss and typical periventricular sequela of chronic small vessel ischemia. There is otherwise no evidence of intracranial hemorrhage, mass or mass effect. The ventricles are normal in size and configuration accounting for surrounding volume loss. The orbits are normal and the paranasal sinuses are grossly clear.  CT PERFUSION: Color maps demonstrate no focal relative deficit in cerebral blood volume to suggest core infarct. There is no evidence of territorial ischemic tissue at risk.  CT ANGIOGRAM: The lung apices are grossly clear. Evaluation of the neck soft tissues demonstrates no pathologic adenopathy or unexpected soft tissue neck mass. Evaluation of the osseous structures demonstrates no evidence of acute fracture or aggressive osseous lesion, with some mild multilevel spondylosis change present. On the right, there is no significant atherosclerotic narrowing of the ICA origin, 0% by the set criteria. Similar 0% narrowing is present on the left. The right vertebral artery is dominant, with a diminutive but otherwise patent left vertebral artery. Intracranially, the carotid siphons demonstrate mild calcific atherosclerotic change, without significant resultant luminal narrowing. The anterior cerebral arteries are mildly tortuous, otherwise normal in course and caliber. The right middle cerebral artery demonstrates no evidence of flow-limiting stenosis, large vessel occlusion or aneurysmal dilatation. The left middle cerebral artery is similarly normal in course and caliber. The vertebral arteries demonstrate some calcific atherosclerotic change on the right, without associated high-grade stenosis. The  diminutive left vertebral artery terminates in PICA. The basilar artery is patent. The posterior cerebral arteries are normal in course and caliber bilaterally.      Age-related changes of the brain as above, otherwise without evidence of acute intracranial abnormality.  CT perfusion demonstrates no evidence of core infarct or significant territorial ischemic tissue at risk.  CT angiogram demonstrates no evidence of flow-limiting stenosis, large vessel occlusion or aneurysmal dilatation. Some multifocal tortuosity is present, suggesting chronic underlying hypertension.  Noncontrast CT head performed at 2:03 PM 9/2/2022. Results discussed with the stroke team by João Brush in person at 2:07 PM same day   This report was finalized on 9/2/2022 2:37 PM by João Brush.      CT Angiogram Head w AI Analysis of LVO    Result Date: 9/2/2022  DATE OF EXAM: 9/2/2022 or utilized for the vascular catheter with 2:05 PM  PROCEDURE: CT CEREBRAL PERFUSION W WO CONTRAST-, CT HEAD WO CONTRAST STROKE PROTOCOL-, CT ANGIOGRAM NECK-, CT ANGIOGRAM HEAD W AI ANALYSIS OF LVO-  INDICATIONS: Neuro deficit, acute, stroke suspected  COMPARISON: No comparisons available.  TECHNIQUE: Routine transaxial cuts were obtained through the head without administration of contrast. Routine transaxial cuts were then obtained through the head following the intravenous administration of 115 mL of Isovue 300. Core blood volume, core blood flow, mean transit time, and Tmax images were obtained utilizing the Rapid software protocol. A limited CT angiogram of the head was also performed to measure the blood vessel density.  Axial noncontrast CT of the head with multiplanar reconstruction.  Axial IV arterial phase contrast-enhanced CT angiogram of the head and neck. Three-dimensional reconstructions were postprocessed.  AI analysis of LVO was also performed.  The radiation dose reduction device was turned on for each scan per the ALARA (As Low as  Reasonably Achievable) protocol.  FINDINGS:  CT head: Gray-white differentiation is maintained and there is no evidence of intracranial hemorrhage, mass or mass effect. Age-related changes of the brain are present including volume loss and typical periventricular sequela of chronic small vessel ischemia. There is otherwise no evidence of intracranial hemorrhage, mass or mass effect. The ventricles are normal in size and configuration accounting for surrounding volume loss. The orbits are normal and the paranasal sinuses are grossly clear.  CT PERFUSION: Color maps demonstrate no focal relative deficit in cerebral blood volume to suggest core infarct. There is no evidence of territorial ischemic tissue at risk.  CT ANGIOGRAM: The lung apices are grossly clear. Evaluation of the neck soft tissues demonstrates no pathologic adenopathy or unexpected soft tissue neck mass. Evaluation of the osseous structures demonstrates no evidence of acute fracture or aggressive osseous lesion, with some mild multilevel spondylosis change present. On the right, there is no significant atherosclerotic narrowing of the ICA origin, 0% by the set criteria. Similar 0% narrowing is present on the left. The right vertebral artery is dominant, with a diminutive but otherwise patent left vertebral artery. Intracranially, the carotid siphons demonstrate mild calcific atherosclerotic change, without significant resultant luminal narrowing. The anterior cerebral arteries are mildly tortuous, otherwise normal in course and caliber. The right middle cerebral artery demonstrates no evidence of flow-limiting stenosis, large vessel occlusion or aneurysmal dilatation. The left middle cerebral artery is similarly normal in course and caliber. The vertebral arteries demonstrate some calcific atherosclerotic change on the right, without associated high-grade stenosis. The diminutive left vertebral artery terminates in PICA. The basilar artery is patent.  The posterior cerebral arteries are normal in course and caliber bilaterally.      Age-related changes of the brain as above, otherwise without evidence of acute intracranial abnormality.  CT perfusion demonstrates no evidence of core infarct or significant territorial ischemic tissue at risk.  CT angiogram demonstrates no evidence of flow-limiting stenosis, large vessel occlusion or aneurysmal dilatation. Some multifocal tortuosity is present, suggesting chronic underlying hypertension.  Noncontrast CT head performed at 2:03 PM 9/2/2022. Results discussed with the stroke team by João Brush in person at 2:07 PM same day   This report was finalized on 9/2/2022 2:37 PM by João Brush.      CT CEREBRAL PERFUSION WITH & WITHOUT CONTRAST    Result Date: 9/2/2022  DATE OF EXAM: 9/2/2022 or utilized for the vascular catheter with 2:05 PM  PROCEDURE: CT CEREBRAL PERFUSION W WO CONTRAST-, CT HEAD WO CONTRAST STROKE PROTOCOL-, CT ANGIOGRAM NECK-, CT ANGIOGRAM HEAD W AI ANALYSIS OF LVO-  INDICATIONS: Neuro deficit, acute, stroke suspected  COMPARISON: No comparisons available.  TECHNIQUE: Routine transaxial cuts were obtained through the head without administration of contrast. Routine transaxial cuts were then obtained through the head following the intravenous administration of 115 mL of Isovue 300. Core blood volume, core blood flow, mean transit time, and Tmax images were obtained utilizing the Rapid software protocol. A limited CT angiogram of the head was also performed to measure the blood vessel density.  Axial noncontrast CT of the head with multiplanar reconstruction.  Axial IV arterial phase contrast-enhanced CT angiogram of the head and neck. Three-dimensional reconstructions were postprocessed.  AI analysis of LVO was also performed.  The radiation dose reduction device was turned on for each scan per the ALARA (As Low as Reasonably Achievable) protocol.  FINDINGS:  CT head: Gray-white differentiation  is maintained and there is no evidence of intracranial hemorrhage, mass or mass effect. Age-related changes of the brain are present including volume loss and typical periventricular sequela of chronic small vessel ischemia. There is otherwise no evidence of intracranial hemorrhage, mass or mass effect. The ventricles are normal in size and configuration accounting for surrounding volume loss. The orbits are normal and the paranasal sinuses are grossly clear.  CT PERFUSION: Color maps demonstrate no focal relative deficit in cerebral blood volume to suggest core infarct. There is no evidence of territorial ischemic tissue at risk.  CT ANGIOGRAM: The lung apices are grossly clear. Evaluation of the neck soft tissues demonstrates no pathologic adenopathy or unexpected soft tissue neck mass. Evaluation of the osseous structures demonstrates no evidence of acute fracture or aggressive osseous lesion, with some mild multilevel spondylosis change present. On the right, there is no significant atherosclerotic narrowing of the ICA origin, 0% by the set criteria. Similar 0% narrowing is present on the left. The right vertebral artery is dominant, with a diminutive but otherwise patent left vertebral artery. Intracranially, the carotid siphons demonstrate mild calcific atherosclerotic change, without significant resultant luminal narrowing. The anterior cerebral arteries are mildly tortuous, otherwise normal in course and caliber. The right middle cerebral artery demonstrates no evidence of flow-limiting stenosis, large vessel occlusion or aneurysmal dilatation. The left middle cerebral artery is similarly normal in course and caliber. The vertebral arteries demonstrate some calcific atherosclerotic change on the right, without associated high-grade stenosis. The diminutive left vertebral artery terminates in PICA. The basilar artery is patent. The posterior cerebral arteries are normal in course and caliber bilaterally.       Age-related changes of the brain as above, otherwise without evidence of acute intracranial abnormality.  CT perfusion demonstrates no evidence of core infarct or significant territorial ischemic tissue at risk.  CT angiogram demonstrates no evidence of flow-limiting stenosis, large vessel occlusion or aneurysmal dilatation. Some multifocal tortuosity is present, suggesting chronic underlying hypertension.  Noncontrast CT head performed at 2:03 PM 9/2/2022. Results discussed with the stroke team by João Brush in person at 2:07 PM same day   This report was finalized on 9/2/2022 2:37 PM by João Brush.          During this visit the following were done:  Labs Reviewed [x]    Labs Ordered []    Radiology Reports Reviewed [x]    Radiology Ordered []    EKG, echo, and/or stress test reviewed [x]    EEG results reviewed  []    EEG reviewed and interpreted per myself   []    Discussed case with neurointerventionalist or neuroradiologist []    Referring Provider Records Reviewed []    ER Records Reviewed []    Hospital Records Reviewed []    History Obtained From Family []    Radiological images view and Interpreted per myself []    Case Discussed with referring provider []     Decision to obtain and request outside records  []        Assessment and Plan     TIA, recurrent dysarthria and facial droop suggestive of threatened small vessel thrombosis. CT with chronic small vessel disease, CTA negative. Chronic A.fib on Eliquis.   - Continue ASA 81 mg,  Eliquis and statin.   - BP<130/80.   - F/U with PCP, neurology clinic prn.    Call for questions, will see prn. Thanks.              Electronically signed by Nacho Taylor MD on 9/3/2022 at 13:38 EDT

## 2022-09-03 NOTE — PLAN OF CARE
Goal Outcome Evaluation:      Patient Aox4 this shift, no complaints of pain or SOB. NIH 1. Patient transported to MRI this shift, unable to tolerate machine, states he is claustrophobic. Declines possibility of anti-anxiety medication for MRI purposes, states it would not help. This AM patient resting in bed, no verbalized complaints.

## 2022-09-03 NOTE — PLAN OF CARE
Goal Outcome Evaluation:  Plan of Care Reviewed With: patient           Outcome Evaluation: Pt has equal BLE strength, good balance, and is indep with mobility. Skilled PT services are not indicated at this time. This was discussed and agreed upon with patient. Discharge PT.

## 2022-09-03 NOTE — DISCHARGE SUMMARY
[facility] Medicine Services  DISCHARGE SUMMARY    Patient Name: Jefferson Arriaga  : 1940  MRN: [id number]    Date of Admission: 2022  1:46 PM  Date of Discharge:  9/3/2022    Primary Care Physician: Brian Ding MD    Consults     Date and Time Order Name Status Description    2022  1:52 PM Inpatient Neurology Consult Stroke Completed           Hospital Course     Presenting Problem:   Dysarthria [R47.1]    Active Hospital Problems    Diagnosis  POA   • Dysarthria [R47.1]  Yes   • HTN (hypertension) [I10]  Yes   • HLD (hyperlipidemia) [E78.5]  Yes   • DM (diabetes mellitus) (HCC) [E11.9]  Yes   • Chronic a-fib (HCC) [I48.20]  Yes      Resolved Hospital Problems   No resolved problems to display.          Hospital Course:  Jefferson Arriaga is a 81 y.o. male history of hypertension, hyperlipidemia, coronary disease with bypass 20 years ago who presents with dysarthria and right facial droop, that was noticed by his daughter more than he noticed it.  He was seen by the stroke team, started on aspirin.  Unable to tolerate the MRI due to severe claustrophobia.  Did undergo a CTA head and neck without any acute abnormalities.  Echocardiogram was normal without any signs of a PFO.  Seen by our physical, occupational, and speech team and found no needs.  Patient does have a history of chronic atrial fibrillation and he should continue on his Eliquis.  Upon discharge she was also started on a baby aspirin and high-dose statin.      Discharge Follow Up Recommendations for outpatient labs/diagnostics:   none    Day of Discharge     HPI:   Doing well.  Wants to go home.  Feels like his speech is doing better and his cognition is also improved.    Review of Systems  Gen- No fevers, chills  CV- No chest pain, palpitations  Resp- No cough, dyspnea  GI- No N/V/D, abd pain        Vital Signs:   Temp:  [97.7 °F (36.5 °C)-98.3 °F (36.8 °C)] 98.1 °F (36.7 °C)  Heart Rate:  [54-81] 74  Resp:   [16-18] 16  BP: (121-159)/(69-81) 121/76      Physical Exam:  Constitutional: No acute distress, awake, alert  HENT: NCAT, mucous membranes moist  Respiratory: Clear to auscultation bilaterally, respiratory effort normal   Cardiovascular: RRR, no murmurs, rubs, or gallops  Gastrointestinal: Positive bowel sounds, soft, nontender, nondistended  Musculoskeletal: No bilateral ankle edema  Psychiatric: Appropriate affect, cooperative  Neurologic: Oriented x 3, strength symmetric in all extremities, Cranial Nerves grossly intact to confrontation, speech clear, although seems to have a slight slurring with a few words.  Patient does not seem to notice anything  Skin: No rashes      Pertinent  and/or Most Recent Results     LAB RESULTS:      Lab 09/03/22  0627 09/02/22  1402 09/02/22  1357 09/02/22  1356   WBC 5.13 5.91  --   --    HEMOGLOBIN 13.5 14.6  --   --    HEMOGLOBIN, POC  --   --  14.3  --    HEMATOCRIT 39.4 42.3  --   --    HEMATOCRIT POC  --   --  42  --    PLATELETS 115* 147  --   --    NEUTROS ABS  --  3.15  --   --    IMMATURE GRANS (ABS)  --  0.01  --   --    LYMPHS ABS  --  2.17  --   --    MONOS ABS  --  0.44  --   --    EOS ABS  --  0.10  --   --    MCV 92.7 93.0  --   --    PROTIME  --   --   --  14.2   APTT  --  36.0  --   --          Lab 09/03/22  0627 09/02/22  1408   SODIUM 144  --    POTASSIUM 4.7  --    CHLORIDE 108*  --    CO2 31.0*  --    ANION GAP 5.0  --    BUN 16  --    CREATININE 1.24 1.30   EGFR 59.1*  --    GLUCOSE 147*  --    CALCIUM 9.2  --    HEMOGLOBIN A1C 6.70*  --          Lab 09/02/22  1402   ALT (SGPT) 14   AST (SGOT) 17         Lab 09/02/22  1402 09/02/22  1356   TROPONIN T <0.010  --    PROTIME  --  14.2   INR  --  1.2         Lab 09/03/22  0627   CHOLESTEROL 92   LDL CHOL 45   HDL CHOL 30*   TRIGLYCERIDES 86             Lab 09/02/22  1357   PH, ARTERIAL 7.32*     Brief Urine Lab Results     None        Microbiology Results (last 10 days)     Procedure Component Value - Date/Time     COVID PRE-OP / PRE-PROCEDURE SCREENING ORDER (NO ISOLATION) - Swab, Nasopharynx [[id number]]  (Normal) Collected: 09/03/22 0639    Lab Status: Final result Specimen: Swab from Nasopharynx Updated: 09/03/22 0802    Narrative:      The following orders were created for panel order COVID PRE-OP / PRE-PROCEDURE SCREENING ORDER (NO ISOLATION) - Swab, Nasopharynx.  Procedure                               Abnormality         Status                     ---------                               -----------         ------                     COVID-19 and FLU A/B PCR...[[id number]]  Normal              Final result                 Please view results for these tests on the individual orders.    COVID-19 and FLU A/B PCR - Swab, Nasopharynx [[id number]]  (Normal) Collected: 09/03/22 0639    Lab Status: Final result Specimen: Swab from Nasopharynx Updated: 09/03/22 0802     COVID19 Not Detected     Influenza A PCR Not Detected     Influenza B PCR Not Detected    Narrative:      Fact sheet for providers: https://www.fda.gov/media/530638/download    Fact sheet for patients: https://www.fda.gov/media/097156/download    Test performed by PCR.          Adult Transthoracic Echo Complete W/ Cont if Necessary Per Protocol (With Agitated Saline)    Result Date: 9/3/2022  · Left ventricular ejection fraction appears to be 56 - 60%. Left ventricular systolic function is normal. · Left ventricular diastolic function was normal. · Left atrial volume is moderately increased. · Saline test results are negative for right to left atrial level shunt. · Estimated right ventricular systolic pressure from tricuspid regurgitation is normal (<35 mmHg).      CT Head Without Contrast    Result Date: 9/3/2022   DATE OF EXAM: 9/3/2022 8:23 AM  PROCEDURE: CT HEAD WO CONTRAST-  INDICATIONS: Stroke, follow up; R47.1-Dysarthria and anarthria  COMPARISON: September 2, 2013  TECHNIQUE: Routine transaxial cuts were obtained through the head without the  administration of contrast. Automated exposure control and iterative reconstruction methods were used.  FINDINGS: There is cerebral atrophy more marked in the frontal lobes. There are some white matter changes suggested involving the cerebral hemispheres which could reflect more chronic small vessel ischemic change. There is no underlying hemorrhage. There is no midline shift. The paranasal sinuses seem relatively clear.      1.  Cerebral atrophy more marked in the frontal lobes. 2.  White matter changes which could reflect more chronic small vessel ischemic change. This appears more pronounced in the right frontal lobe.  This report was finalized on 9/3/2022 9:26 AM by Wayne Morrell MD.      CT Angiogram Neck    Result Date: 9/2/2022  DATE OF EXAM: 9/2/2022 or utilized for the vascular catheter with 2:05 PM  PROCEDURE: CT CEREBRAL PERFUSION W WO CONTRAST-, CT HEAD WO CONTRAST STROKE PROTOCOL-, CT ANGIOGRAM NECK-, CT ANGIOGRAM HEAD W AI ANALYSIS OF LVO-  INDICATIONS: Neuro deficit, acute, stroke suspected  COMPARISON: No comparisons available.  TECHNIQUE: Routine transaxial cuts were obtained through the head without administration of contrast. Routine transaxial cuts were then obtained through the head following the intravenous administration of 115 mL of Isovue 300. Core blood volume, core blood flow, mean transit time, and Tmax images were obtained utilizing the Rapid software protocol. A limited CT angiogram of the head was also performed to measure the blood vessel density.  Axial noncontrast CT of the head with multiplanar reconstruction.  Axial IV arterial phase contrast-enhanced CT angiogram of the head and neck. Three-dimensional reconstructions were postprocessed.  AI analysis of LVO was also performed.  The radiation dose reduction device was turned on for each scan per the ALARA (As Low as Reasonably Achievable) protocol.  FINDINGS:  CT head: Gray-white differentiation is maintained and there is no  evidence of intracranial hemorrhage, mass or mass effect. Age-related changes of the brain are present including volume loss and typical periventricular sequela of chronic small vessel ischemia. There is otherwise no evidence of intracranial hemorrhage, mass or mass effect. The ventricles are normal in size and configuration accounting for surrounding volume loss. The orbits are normal and the paranasal sinuses are grossly clear.  CT PERFUSION: Color maps demonstrate no focal relative deficit in cerebral blood volume to suggest core infarct. There is no evidence of territorial ischemic tissue at risk.  CT ANGIOGRAM: The lung apices are grossly clear. Evaluation of the neck soft tissues demonstrates no pathologic adenopathy or unexpected soft tissue neck mass. Evaluation of the osseous structures demonstrates no evidence of acute fracture or aggressive osseous lesion, with some mild multilevel spondylosis change present. On the right, there is no significant atherosclerotic narrowing of the ICA origin, 0% by the set criteria. Similar 0% narrowing is present on the left. The right vertebral artery is dominant, with a diminutive but otherwise patent left vertebral artery. Intracranially, the carotid siphons demonstrate mild calcific atherosclerotic change, without significant resultant luminal narrowing. The anterior cerebral arteries are mildly tortuous, otherwise normal in course and caliber. The right middle cerebral artery demonstrates no evidence of flow-limiting stenosis, large vessel occlusion or aneurysmal dilatation. The left middle cerebral artery is similarly normal in course and caliber. The vertebral arteries demonstrate some calcific atherosclerotic change on the right, without associated high-grade stenosis. The diminutive left vertebral artery terminates in PICA. The basilar artery is patent. The posterior cerebral arteries are normal in course and caliber bilaterally.      Age-related changes of the  brain as above, otherwise without evidence of acute intracranial abnormality.  CT perfusion demonstrates no evidence of core infarct or significant territorial ischemic tissue at risk.  CT angiogram demonstrates no evidence of flow-limiting stenosis, large vessel occlusion or aneurysmal dilatation. Some multifocal tortuosity is present, suggesting chronic underlying hypertension.  Noncontrast CT head performed at 2:03 PM 9/2/2022. Results discussed with the stroke team by João Brush in person at 2:07 PM same day   This report was finalized on 9/2/2022 2:37 PM by João Brush.      XR Chest 1 View    Result Date: 9/2/2022  XR CHEST 1 VW-  Date of Exam: 9/2/2022 2:03 PM  Indication: Acute Stroke Protocol (onset < 12 hrs).  Comparison Exams: None available.  Technique: Single AP chest radiograph  FINDINGS: The lungs are clear. The heart and mediastinal contours appear normal. The pulmonary vasculature appears normal. The osseous structures appear intact. Median sternotomy wires appear intact.      No acute cardiopulmonary process identified.  This report was finalized on 9/2/2022 2:53 PM by Jorgito Velasquez MD.      CT Head Without Contrast Stroke Protocol    Result Date: 9/2/2022  DATE OF EXAM: 9/2/2022 or utilized for the vascular catheter with 2:05 PM  PROCEDURE: CT CEREBRAL PERFUSION W WO CONTRAST-, CT HEAD WO CONTRAST STROKE PROTOCOL-, CT ANGIOGRAM NECK-, CT ANGIOGRAM HEAD W AI ANALYSIS OF LVO-  INDICATIONS: Neuro deficit, acute, stroke suspected  COMPARISON: No comparisons available.  TECHNIQUE: Routine transaxial cuts were obtained through the head without administration of contrast. Routine transaxial cuts were then obtained through the head following the intravenous administration of 115 mL of Isovue 300. Core blood volume, core blood flow, mean transit time, and Tmax images were obtained utilizing the Rapid software protocol. A limited CT angiogram of the head was also performed to measure the blood  vessel density.  Axial noncontrast CT of the head with multiplanar reconstruction.  Axial IV arterial phase contrast-enhanced CT angiogram of the head and neck. Three-dimensional reconstructions were postprocessed.  AI analysis of LVO was also performed.  The radiation dose reduction device was turned on for each scan per the ALARA (As Low as Reasonably Achievable) protocol.  FINDINGS:  CT head: Gray-white differentiation is maintained and there is no evidence of intracranial hemorrhage, mass or mass effect. Age-related changes of the brain are present including volume loss and typical periventricular sequela of chronic small vessel ischemia. There is otherwise no evidence of intracranial hemorrhage, mass or mass effect. The ventricles are normal in size and configuration accounting for surrounding volume loss. The orbits are normal and the paranasal sinuses are grossly clear.  CT PERFUSION: Color maps demonstrate no focal relative deficit in cerebral blood volume to suggest core infarct. There is no evidence of territorial ischemic tissue at risk.  CT ANGIOGRAM: The lung apices are grossly clear. Evaluation of the neck soft tissues demonstrates no pathologic adenopathy or unexpected soft tissue neck mass. Evaluation of the osseous structures demonstrates no evidence of acute fracture or aggressive osseous lesion, with some mild multilevel spondylosis change present. On the right, there is no significant atherosclerotic narrowing of the ICA origin, 0% by the set criteria. Similar 0% narrowing is present on the left. The right vertebral artery is dominant, with a diminutive but otherwise patent left vertebral artery. Intracranially, the carotid siphons demonstrate mild calcific atherosclerotic change, without significant resultant luminal narrowing. The anterior cerebral arteries are mildly tortuous, otherwise normal in course and caliber. The right middle cerebral artery demonstrates no evidence of flow-limiting  stenosis, large vessel occlusion or aneurysmal dilatation. The left middle cerebral artery is similarly normal in course and caliber. The vertebral arteries demonstrate some calcific atherosclerotic change on the right, without associated high-grade stenosis. The diminutive left vertebral artery terminates in PICA. The basilar artery is patent. The posterior cerebral arteries are normal in course and caliber bilaterally.      Age-related changes of the brain as above, otherwise without evidence of acute intracranial abnormality.  CT perfusion demonstrates no evidence of core infarct or significant territorial ischemic tissue at risk.  CT angiogram demonstrates no evidence of flow-limiting stenosis, large vessel occlusion or aneurysmal dilatation. Some multifocal tortuosity is present, suggesting chronic underlying hypertension.  Noncontrast CT head performed at 2:03 PM 9/2/2022. Results discussed with the stroke team by João Brush in person at 2:07 PM same day   This report was finalized on 9/2/2022 2:37 PM by João Brush.      CT Angiogram Head w AI Analysis of LVO    Result Date: 9/2/2022  DATE OF EXAM: 9/2/2022 or utilized for the vascular catheter with 2:05 PM  PROCEDURE: CT CEREBRAL PERFUSION W WO CONTRAST-, CT HEAD WO CONTRAST STROKE PROTOCOL-, CT ANGIOGRAM NECK-, CT ANGIOGRAM HEAD W AI ANALYSIS OF LVO-  INDICATIONS: Neuro deficit, acute, stroke suspected  COMPARISON: No comparisons available.  TECHNIQUE: Routine transaxial cuts were obtained through the head without administration of contrast. Routine transaxial cuts were then obtained through the head following the intravenous administration of 115 mL of Isovue 300. Core blood volume, core blood flow, mean transit time, and Tmax images were obtained utilizing the Rapid software protocol. A limited CT angiogram of the head was also performed to measure the blood vessel density.  Axial noncontrast CT of the head with multiplanar reconstruction.   Axial IV arterial phase contrast-enhanced CT angiogram of the head and neck. Three-dimensional reconstructions were postprocessed.  AI analysis of LVO was also performed.  The radiation dose reduction device was turned on for each scan per the ALARA (As Low as Reasonably Achievable) protocol.  FINDINGS:  CT head: Gray-white differentiation is maintained and there is no evidence of intracranial hemorrhage, mass or mass effect. Age-related changes of the brain are present including volume loss and typical periventricular sequela of chronic small vessel ischemia. There is otherwise no evidence of intracranial hemorrhage, mass or mass effect. The ventricles are normal in size and configuration accounting for surrounding volume loss. The orbits are normal and the paranasal sinuses are grossly clear.  CT PERFUSION: Color maps demonstrate no focal relative deficit in cerebral blood volume to suggest core infarct. There is no evidence of territorial ischemic tissue at risk.  CT ANGIOGRAM: The lung apices are grossly clear. Evaluation of the neck soft tissues demonstrates no pathologic adenopathy or unexpected soft tissue neck mass. Evaluation of the osseous structures demonstrates no evidence of acute fracture or aggressive osseous lesion, with some mild multilevel spondylosis change present. On the right, there is no significant atherosclerotic narrowing of the ICA origin, 0% by the set criteria. Similar 0% narrowing is present on the left. The right vertebral artery is dominant, with a diminutive but otherwise patent left vertebral artery. Intracranially, the carotid siphons demonstrate mild calcific atherosclerotic change, without significant resultant luminal narrowing. The anterior cerebral arteries are mildly tortuous, otherwise normal in course and caliber. The right middle cerebral artery demonstrates no evidence of flow-limiting stenosis, large vessel occlusion or aneurysmal dilatation. The left middle cerebral  artery is similarly normal in course and caliber. The vertebral arteries demonstrate some calcific atherosclerotic change on the right, without associated high-grade stenosis. The diminutive left vertebral artery terminates in PICA. The basilar artery is patent. The posterior cerebral arteries are normal in course and caliber bilaterally.      Age-related changes of the brain as above, otherwise without evidence of acute intracranial abnormality.  CT perfusion demonstrates no evidence of core infarct or significant territorial ischemic tissue at risk.  CT angiogram demonstrates no evidence of flow-limiting stenosis, large vessel occlusion or aneurysmal dilatation. Some multifocal tortuosity is present, suggesting chronic underlying hypertension.  Noncontrast CT head performed at 2:03 PM 9/2/2022. Results discussed with the stroke team by João Brush in person at 2:07 PM same day   This report was finalized on 9/2/2022 2:37 PM by João Brush.      CT CEREBRAL PERFUSION WITH & WITHOUT CONTRAST    Result Date: 9/2/2022  DATE OF EXAM: 9/2/2022 or utilized for the vascular catheter with 2:05 PM  PROCEDURE: CT CEREBRAL PERFUSION W WO CONTRAST-, CT HEAD WO CONTRAST STROKE PROTOCOL-, CT ANGIOGRAM NECK-, CT ANGIOGRAM HEAD W AI ANALYSIS OF LVO-  INDICATIONS: Neuro deficit, acute, stroke suspected  COMPARISON: No comparisons available.  TECHNIQUE: Routine transaxial cuts were obtained through the head without administration of contrast. Routine transaxial cuts were then obtained through the head following the intravenous administration of 115 mL of Isovue 300. Core blood volume, core blood flow, mean transit time, and Tmax images were obtained utilizing the Rapid software protocol. A limited CT angiogram of the head was also performed to measure the blood vessel density.  Axial noncontrast CT of the head with multiplanar reconstruction.  Axial IV arterial phase contrast-enhanced CT angiogram of the head and neck.  Three-dimensional reconstructions were postprocessed.  AI analysis of LVO was also performed.  The radiation dose reduction device was turned on for each scan per the ALARA (As Low as Reasonably Achievable) protocol.  FINDINGS:  CT head: Gray-white differentiation is maintained and there is no evidence of intracranial hemorrhage, mass or mass effect. Age-related changes of the brain are present including volume loss and typical periventricular sequela of chronic small vessel ischemia. There is otherwise no evidence of intracranial hemorrhage, mass or mass effect. The ventricles are normal in size and configuration accounting for surrounding volume loss. The orbits are normal and the paranasal sinuses are grossly clear.  CT PERFUSION: Color maps demonstrate no focal relative deficit in cerebral blood volume to suggest core infarct. There is no evidence of territorial ischemic tissue at risk.  CT ANGIOGRAM: The lung apices are grossly clear. Evaluation of the neck soft tissues demonstrates no pathologic adenopathy or unexpected soft tissue neck mass. Evaluation of the osseous structures demonstrates no evidence of acute fracture or aggressive osseous lesion, with some mild multilevel spondylosis change present. On the right, there is no significant atherosclerotic narrowing of the ICA origin, 0% by the set criteria. Similar 0% narrowing is present on the left. The right vertebral artery is dominant, with a diminutive but otherwise patent left vertebral artery. Intracranially, the carotid siphons demonstrate mild calcific atherosclerotic change, without significant resultant luminal narrowing. The anterior cerebral arteries are mildly tortuous, otherwise normal in course and caliber. The right middle cerebral artery demonstrates no evidence of flow-limiting stenosis, large vessel occlusion or aneurysmal dilatation. The left middle cerebral artery is similarly normal in course and caliber. The vertebral arteries  demonstrate some calcific atherosclerotic change on the right, without associated high-grade stenosis. The diminutive left vertebral artery terminates in PICA. The basilar artery is patent. The posterior cerebral arteries are normal in course and caliber bilaterally.      Age-related changes of the brain as above, otherwise without evidence of acute intracranial abnormality.  CT perfusion demonstrates no evidence of core infarct or significant territorial ischemic tissue at risk.  CT angiogram demonstrates no evidence of flow-limiting stenosis, large vessel occlusion or aneurysmal dilatation. Some multifocal tortuosity is present, suggesting chronic underlying hypertension.  Noncontrast CT head performed at 2:03 PM 9/2/2022. Results discussed with the stroke team by João Brush in person at 2:07 PM same day   This report was finalized on 9/2/2022 2:37 PM by João Brush.                Results for orders placed during the hospital encounter of 09/02/22    Adult Transthoracic Echo Complete W/ Cont if Necessary Per Protocol (With Agitated Saline)    Interpretation Summary  · Left ventricular ejection fraction appears to be 56 - 60%. Left ventricular systolic function is normal.  · Left ventricular diastolic function was normal.  · Left atrial volume is moderately increased.  · Saline test results are negative for right to left atrial level shunt.  · Estimated right ventricular systolic pressure from tricuspid regurgitation is normal (<35 mmHg).      Plan for Follow-up of Pending Labs/Results:     Discharge Details        Discharge Medications      New Medications      Instructions Start Date   aspirin 81 MG chewable tablet   81 mg, Oral, Daily   Start Date: September 4, 2022     atorvastatin 80 MG tablet  Commonly known as: LIPITOR   80 mg, Oral, Nightly         Continue These Medications      Instructions Start Date   ALPHA LIPOIC ACID PO   600 mg, Oral, Daily      apixaban 5 MG tablet tablet  Commonly known  as: ELIQUIS   5 mg, Oral, 2 Times Daily      coenzyme Q10 100 MG capsule   100 mg, Oral, Daily      FENUGREEK PO   1,220 mg, Oral, 2 Times Daily      Gymnema Sylvestris Leaf powder   600 mg, Does not apply, 2 Times Daily      meclizine 25 MG tablet  Commonly known as: ANTIVERT   25 mg, Oral, 3 Times Daily PRN      melatonin 5 MG tablet tablet   5 mg, Oral      metFORMIN 500 MG tablet  Commonly known as: GLUCOPHAGE   500 mg, Oral, 2 Times Daily With Meals      metoprolol tartrate 100 MG tablet  Commonly known as: LOPRESSOR   100 mg, Oral, 2 Times Daily      tamsulosin 0.4 MG capsule 24 hr capsule  Commonly known as: FLOMAX   1 capsule, Oral, Daily      vitamin D3 125 MCG (5000 UT) capsule capsule   5,000 Units, Oral, Daily             No Known Allergies      Discharge Disposition:  Home or Self Care    Diet:  Hospital:  Diet Order   Procedures   • Diet Regular; Cardiac, Consistent Carbohydrate       Activity:as tolerated      Restrictions or Other Recommendations:  none       CODE STATUS:    Code Status and Medical Interventions:   Ordered at: 09/02/22 1804     Medical Intervention Limits:    NO intubation (DNI)     Level Of Support Discussed With:    Patient     Code Status (Patient has no pulse and is not breathing):    No CPR (Do Not Attempt to Resuscitate)     Medical Interventions (Patient has pulse or is breathing):    Limited Support       No future appointments.              Flaca Parnell MD  09/03/22      Time Spent on Discharge:  I spent  25  minutes on this discharge activity which included: face-to-face encounter with the patient, reviewing the data in the system, coordination of the care with the nursing staff as well as consultants, documentation, and entering orders.

## 2022-09-03 NOTE — PLAN OF CARE
Goal Outcome Evaluation:     Patient up independently this shift.  NIH = 0.  No complaints or concerns voiced.  Appropriate for discharge at this time.

## 2022-09-03 NOTE — PLAN OF CARE
Goal Outcome Evaluation:      SLP evaluation completed. Will sign-off for speech, language, and cognitive communciation. Please see note for further details and recommendations.

## 2022-09-03 NOTE — PLAN OF CARE
Goal Outcome Evaluation:  Plan of Care Reviewed With: patient        Progress: improving  Outcome Evaluation: OT evaluation completed. Pt presents with baseline performance in ADLs, related t/fs, mobility with gross I in each. Pt presents with functional strength, ROM at BUEs, no sensation or coordination deficits, no balance deficits in sitting or standing without AD. Educated pt on recognizing s/s of CVA and importance of seeking timely care and further general home safety to maximize I and safety in preparation for home and resumed responsibilities. No further OT services warranted at this time. Recommend home with A when medically ready.